# Patient Record
Sex: MALE | Race: WHITE | NOT HISPANIC OR LATINO | Employment: OTHER | URBAN - METROPOLITAN AREA
[De-identification: names, ages, dates, MRNs, and addresses within clinical notes are randomized per-mention and may not be internally consistent; named-entity substitution may affect disease eponyms.]

---

## 2018-08-13 ENCOUNTER — HOSPITAL ENCOUNTER (INPATIENT)
Facility: MEDICAL CENTER | Age: 83
LOS: 3 days | DRG: 064 | End: 2018-08-17
Attending: EMERGENCY MEDICINE | Admitting: HOSPITALIST
Payer: MEDICARE

## 2018-08-13 DIAGNOSIS — G93.40 ACUTE ENCEPHALOPATHY: ICD-10-CM

## 2018-08-13 PROCEDURE — 99285 EMERGENCY DEPT VISIT HI MDM: CPT

## 2018-08-13 PROCEDURE — 80053 COMPREHEN METABOLIC PANEL: CPT

## 2018-08-13 PROCEDURE — 83036 HEMOGLOBIN GLYCOSYLATED A1C: CPT

## 2018-08-13 PROCEDURE — 84484 ASSAY OF TROPONIN QUANT: CPT

## 2018-08-13 PROCEDURE — 85610 PROTHROMBIN TIME: CPT

## 2018-08-13 PROCEDURE — 85025 COMPLETE CBC W/AUTO DIFF WBC: CPT

## 2018-08-13 PROCEDURE — 36415 COLL VENOUS BLD VENIPUNCTURE: CPT

## 2018-08-13 PROCEDURE — 85730 THROMBOPLASTIN TIME PARTIAL: CPT

## 2018-08-13 ASSESSMENT — LIFESTYLE VARIABLES: DO YOU DRINK ALCOHOL: NO

## 2018-08-13 ASSESSMENT — PAIN SCALES - GENERAL: PAINLEVEL_OUTOF10: 0

## 2018-08-14 ENCOUNTER — APPOINTMENT (OUTPATIENT)
Dept: RADIOLOGY | Facility: MEDICAL CENTER | Age: 83
DRG: 064 | End: 2018-08-14
Attending: HOSPITALIST
Payer: MEDICARE

## 2018-08-14 ENCOUNTER — APPOINTMENT (OUTPATIENT)
Dept: RADIOLOGY | Facility: MEDICAL CENTER | Age: 83
DRG: 064 | End: 2018-08-14
Attending: EMERGENCY MEDICINE
Payer: MEDICARE

## 2018-08-14 PROBLEM — I10 ESSENTIAL HYPERTENSION: Status: ACTIVE | Noted: 2018-08-14

## 2018-08-14 PROBLEM — D64.9 NORMOCYTIC ANEMIA: Status: ACTIVE | Noted: 2018-08-14

## 2018-08-14 PROBLEM — R79.89 TROPONIN I ABOVE REFERENCE RANGE: Status: ACTIVE | Noted: 2018-08-14

## 2018-08-14 PROBLEM — G93.40 ACUTE ENCEPHALOPATHY: Status: ACTIVE | Noted: 2018-08-14

## 2018-08-14 PROBLEM — E78.5 DYSLIPIDEMIA: Status: ACTIVE | Noted: 2018-08-14

## 2018-08-14 LAB
ALBUMIN SERPL BCP-MCNC: 3.9 G/DL (ref 3.2–4.9)
ALBUMIN/GLOB SERPL: 1.6 G/DL
ALP SERPL-CCNC: 51 U/L (ref 30–99)
ALT SERPL-CCNC: 11 U/L (ref 2–50)
ANION GAP SERPL CALC-SCNC: 11 MMOL/L (ref 0–11.9)
ANION GAP SERPL CALC-SCNC: 8 MMOL/L (ref 0–11.9)
APPEARANCE UR: ABNORMAL
APTT PPP: 30.3 SEC (ref 24.7–36)
AST SERPL-CCNC: 15 U/L (ref 12–45)
BACTERIA #/AREA URNS HPF: NEGATIVE /HPF
BASOPHILS # BLD AUTO: 0.5 % (ref 0–1.8)
BASOPHILS # BLD: 0.05 K/UL (ref 0–0.12)
BILIRUB SERPL-MCNC: 0.6 MG/DL (ref 0.1–1.5)
BILIRUB UR QL STRIP.AUTO: NEGATIVE
BUN SERPL-MCNC: 26 MG/DL (ref 8–22)
BUN SERPL-MCNC: 28 MG/DL (ref 8–22)
CALCIUM SERPL-MCNC: 8.3 MG/DL (ref 8.5–10.5)
CALCIUM SERPL-MCNC: 9.1 MG/DL (ref 8.5–10.5)
CAOX CRY #/AREA URNS HPF: ABNORMAL /HPF
CHLORIDE SERPL-SCNC: 110 MMOL/L (ref 96–112)
CHLORIDE SERPL-SCNC: 110 MMOL/L (ref 96–112)
CO2 SERPL-SCNC: 23 MMOL/L (ref 20–33)
CO2 SERPL-SCNC: 25 MMOL/L (ref 20–33)
COLOR UR: YELLOW
CREAT SERPL-MCNC: 1.06 MG/DL (ref 0.5–1.4)
CREAT SERPL-MCNC: 1.39 MG/DL (ref 0.5–1.4)
EOSINOPHIL # BLD AUTO: 0.01 K/UL (ref 0–0.51)
EOSINOPHIL NFR BLD: 0.1 % (ref 0–6.9)
EPI CELLS #/AREA URNS HPF: NEGATIVE /HPF
ERYTHROCYTE [DISTWIDTH] IN BLOOD BY AUTOMATED COUNT: 45.7 FL (ref 35.9–50)
EST. AVERAGE GLUCOSE BLD GHB EST-MCNC: 114 MG/DL
GLOBULIN SER CALC-MCNC: 2.4 G/DL (ref 1.9–3.5)
GLUCOSE SERPL-MCNC: 101 MG/DL (ref 65–99)
GLUCOSE SERPL-MCNC: 102 MG/DL (ref 65–99)
GLUCOSE UR STRIP.AUTO-MCNC: NEGATIVE MG/DL
HBA1C MFR BLD: 5.6 % (ref 0–5.6)
HCT VFR BLD AUTO: 42.2 % (ref 42–52)
HGB BLD-MCNC: 13.9 G/DL (ref 14–18)
HYALINE CASTS #/AREA URNS LPF: ABNORMAL /LPF
IMM GRANULOCYTES # BLD AUTO: 0.05 K/UL (ref 0–0.11)
IMM GRANULOCYTES NFR BLD AUTO: 0.5 % (ref 0–0.9)
INR PPP: 1.06 (ref 0.87–1.13)
KETONES UR STRIP.AUTO-MCNC: ABNORMAL MG/DL
LEUKOCYTE ESTERASE UR QL STRIP.AUTO: NEGATIVE
LYMPHOCYTES # BLD AUTO: 1.07 K/UL (ref 1–4.8)
LYMPHOCYTES NFR BLD: 11.5 % (ref 22–41)
MAGNESIUM SERPL-MCNC: 2 MG/DL (ref 1.5–2.5)
MCH RBC QN AUTO: 29.1 PG (ref 27–33)
MCHC RBC AUTO-ENTMCNC: 32.9 G/DL (ref 33.7–35.3)
MCV RBC AUTO: 88.5 FL (ref 81.4–97.8)
MICRO URNS: ABNORMAL
MONOCYTES # BLD AUTO: 0.93 K/UL (ref 0–0.85)
MONOCYTES NFR BLD AUTO: 10 % (ref 0–13.4)
NEUTROPHILS # BLD AUTO: 7.2 K/UL (ref 1.82–7.42)
NEUTROPHILS NFR BLD: 77.4 % (ref 44–72)
NITRITE UR QL STRIP.AUTO: NEGATIVE
NRBC # BLD AUTO: 0 K/UL
NRBC BLD-RTO: 0 /100 WBC
PH UR STRIP.AUTO: 5 [PH]
PLATELET # BLD AUTO: 244 K/UL (ref 164–446)
PMV BLD AUTO: 10.4 FL (ref 9–12.9)
POTASSIUM SERPL-SCNC: 3.2 MMOL/L (ref 3.6–5.5)
POTASSIUM SERPL-SCNC: 4 MMOL/L (ref 3.6–5.5)
PROT SERPL-MCNC: 6.3 G/DL (ref 6–8.2)
PROT UR QL STRIP: NEGATIVE MG/DL
PROTHROMBIN TIME: 13.5 SEC (ref 12–14.6)
RBC # BLD AUTO: 4.77 M/UL (ref 4.7–6.1)
RBC # URNS HPF: ABNORMAL /HPF
RBC UR QL AUTO: ABNORMAL
SODIUM SERPL-SCNC: 141 MMOL/L (ref 135–145)
SODIUM SERPL-SCNC: 146 MMOL/L (ref 135–145)
SP GR UR STRIP.AUTO: 1.02
TROPONIN I SERPL-MCNC: 0.13 NG/ML (ref 0–0.04)
TROPONIN I SERPL-MCNC: 0.25 NG/ML (ref 0–0.04)
TROPONIN I SERPL-MCNC: 0.27 NG/ML (ref 0–0.04)
UROBILINOGEN UR STRIP.AUTO-MCNC: 1 MG/DL
WBC # BLD AUTO: 9.3 K/UL (ref 4.8–10.8)
WBC #/AREA URNS HPF: ABNORMAL /HPF

## 2018-08-14 PROCEDURE — 700111 HCHG RX REV CODE 636 W/ 250 OVERRIDE (IP): Performed by: EMERGENCY MEDICINE

## 2018-08-14 PROCEDURE — 83735 ASSAY OF MAGNESIUM: CPT

## 2018-08-14 PROCEDURE — 700102 HCHG RX REV CODE 250 W/ 637 OVERRIDE(OP): Performed by: HOSPITALIST

## 2018-08-14 PROCEDURE — 70450 CT HEAD/BRAIN W/O DYE: CPT

## 2018-08-14 PROCEDURE — 93010 ELECTROCARDIOGRAM REPORT: CPT | Performed by: INTERNAL MEDICINE

## 2018-08-14 PROCEDURE — 700111 HCHG RX REV CODE 636 W/ 250 OVERRIDE (IP): Performed by: HOSPITALIST

## 2018-08-14 PROCEDURE — A9270 NON-COVERED ITEM OR SERVICE: HCPCS | Performed by: HOSPITALIST

## 2018-08-14 PROCEDURE — 770020 HCHG ROOM/CARE - TELE (206)

## 2018-08-14 PROCEDURE — 700102 HCHG RX REV CODE 250 W/ 637 OVERRIDE(OP): Performed by: EMERGENCY MEDICINE

## 2018-08-14 PROCEDURE — 70551 MRI BRAIN STEM W/O DYE: CPT

## 2018-08-14 PROCEDURE — 36415 COLL VENOUS BLD VENIPUNCTURE: CPT

## 2018-08-14 PROCEDURE — 700101 HCHG RX REV CODE 250: Performed by: HOSPITALIST

## 2018-08-14 PROCEDURE — 99223 1ST HOSP IP/OBS HIGH 75: CPT | Performed by: HOSPITALIST

## 2018-08-14 PROCEDURE — 93005 ELECTROCARDIOGRAM TRACING: CPT | Performed by: HOSPITALIST

## 2018-08-14 PROCEDURE — 80048 BASIC METABOLIC PNL TOTAL CA: CPT

## 2018-08-14 PROCEDURE — 84484 ASSAY OF TROPONIN QUANT: CPT

## 2018-08-14 PROCEDURE — 96365 THER/PROPH/DIAG IV INF INIT: CPT

## 2018-08-14 PROCEDURE — A9270 NON-COVERED ITEM OR SERVICE: HCPCS | Performed by: EMERGENCY MEDICINE

## 2018-08-14 PROCEDURE — 81001 URINALYSIS AUTO W/SCOPE: CPT

## 2018-08-14 PROCEDURE — 700105 HCHG RX REV CODE 258: Performed by: HOSPITALIST

## 2018-08-14 PROCEDURE — 93005 ELECTROCARDIOGRAM TRACING: CPT | Performed by: EMERGENCY MEDICINE

## 2018-08-14 RX ORDER — HEPARIN SODIUM 5000 [USP'U]/ML
5000 INJECTION, SOLUTION INTRAVENOUS; SUBCUTANEOUS EVERY 8 HOURS
Status: DISCONTINUED | OUTPATIENT
Start: 2018-08-14 | End: 2018-08-17 | Stop reason: HOSPADM

## 2018-08-14 RX ORDER — ATORVASTATIN CALCIUM 40 MG/1
20 TABLET, FILM COATED ORAL
Status: ON HOLD | COMMUNITY
Start: 2014-09-01 | End: 2018-08-17

## 2018-08-14 RX ORDER — CYCLOBENZAPRINE HCL 10 MG
20 TABLET ORAL NIGHTLY PRN
Status: ON HOLD | COMMUNITY
End: 2018-08-17

## 2018-08-14 RX ORDER — ONDANSETRON 4 MG/1
4 TABLET, ORALLY DISINTEGRATING ORAL EVERY 4 HOURS PRN
Status: DISCONTINUED | OUTPATIENT
Start: 2018-08-14 | End: 2018-08-17 | Stop reason: HOSPADM

## 2018-08-14 RX ORDER — POTASSIUM CHLORIDE 20 MEQ/1
40 TABLET, EXTENDED RELEASE ORAL ONCE
Status: COMPLETED | OUTPATIENT
Start: 2018-08-14 | End: 2018-08-14

## 2018-08-14 RX ORDER — ASPIRIN 325 MG
325 TABLET ORAL DAILY
Status: DISCONTINUED | OUTPATIENT
Start: 2018-08-15 | End: 2018-08-17 | Stop reason: HOSPADM

## 2018-08-14 RX ORDER — ATENOLOL 50 MG/1
100 TABLET ORAL
Status: DISCONTINUED | OUTPATIENT
Start: 2018-08-14 | End: 2018-08-14

## 2018-08-14 RX ORDER — AMOXICILLIN 250 MG
2 CAPSULE ORAL 2 TIMES DAILY
Status: DISCONTINUED | OUTPATIENT
Start: 2018-08-14 | End: 2018-08-17 | Stop reason: HOSPADM

## 2018-08-14 RX ORDER — ATORVASTATIN CALCIUM 40 MG/1
40 TABLET, FILM COATED ORAL
Status: DISCONTINUED | OUTPATIENT
Start: 2018-08-15 | End: 2018-08-17 | Stop reason: HOSPADM

## 2018-08-14 RX ORDER — ASPIRIN 81 MG/1
324 TABLET, CHEWABLE ORAL DAILY
Status: DISCONTINUED | OUTPATIENT
Start: 2018-08-15 | End: 2018-08-17 | Stop reason: HOSPADM

## 2018-08-14 RX ORDER — ATENOLOL AND CHLORTHALIDONE TABLET 100; 25 MG/1; MG/1
1 TABLET ORAL DAILY
Status: ON HOLD | COMMUNITY
Start: 2014-09-01 | End: 2018-08-17

## 2018-08-14 RX ORDER — ACETAMINOPHEN 325 MG/1
650 TABLET ORAL EVERY 6 HOURS PRN
Status: DISCONTINUED | OUTPATIENT
Start: 2018-08-14 | End: 2018-08-17 | Stop reason: HOSPADM

## 2018-08-14 RX ORDER — LISINOPRIL 20 MG/1
20 TABLET ORAL DAILY
Status: ON HOLD | COMMUNITY
End: 2018-08-17

## 2018-08-14 RX ORDER — LISINOPRIL 20 MG/1
20 TABLET ORAL DAILY
Status: DISCONTINUED | OUTPATIENT
Start: 2018-08-14 | End: 2018-08-15

## 2018-08-14 RX ORDER — LISINOPRIL 10 MG/1
20 TABLET ORAL DAILY
Status: ON HOLD | COMMUNITY
Start: 2014-09-01 | End: 2018-08-14

## 2018-08-14 RX ORDER — SODIUM CHLORIDE AND POTASSIUM CHLORIDE 150; 450 MG/100ML; MG/100ML
INJECTION, SOLUTION INTRAVENOUS CONTINUOUS
Status: DISCONTINUED | OUTPATIENT
Start: 2018-08-14 | End: 2018-08-15

## 2018-08-14 RX ORDER — ATENOLOL 25 MG/1
25 TABLET ORAL
Status: DISCONTINUED | OUTPATIENT
Start: 2018-08-15 | End: 2018-08-14

## 2018-08-14 RX ORDER — ONDANSETRON 2 MG/ML
4 INJECTION INTRAMUSCULAR; INTRAVENOUS EVERY 4 HOURS PRN
Status: DISCONTINUED | OUTPATIENT
Start: 2018-08-14 | End: 2018-08-17 | Stop reason: HOSPADM

## 2018-08-14 RX ORDER — POTASSIUM CHLORIDE 7.45 MG/ML
10 INJECTION INTRAVENOUS ONCE
Status: COMPLETED | OUTPATIENT
Start: 2018-08-14 | End: 2018-08-14

## 2018-08-14 RX ORDER — ASPIRIN 81 MG/1
324 TABLET, CHEWABLE ORAL ONCE
Status: COMPLETED | OUTPATIENT
Start: 2018-08-14 | End: 2018-08-14

## 2018-08-14 RX ORDER — POLYETHYLENE GLYCOL 3350 17 G/17G
1 POWDER, FOR SOLUTION ORAL
Status: DISCONTINUED | OUTPATIENT
Start: 2018-08-14 | End: 2018-08-17 | Stop reason: HOSPADM

## 2018-08-14 RX ORDER — BISACODYL 10 MG
10 SUPPOSITORY, RECTAL RECTAL
Status: DISCONTINUED | OUTPATIENT
Start: 2018-08-14 | End: 2018-08-17 | Stop reason: HOSPADM

## 2018-08-14 RX ORDER — CHLORTHALIDONE 25 MG/1
25 TABLET ORAL
Status: DISCONTINUED | OUTPATIENT
Start: 2018-08-14 | End: 2018-08-14

## 2018-08-14 RX ORDER — ATENOLOL AND CHLORTHALIDONE TABLET 100; 25 MG/1; MG/1
1 TABLET ORAL DAILY
Status: DISCONTINUED | OUTPATIENT
Start: 2018-08-14 | End: 2018-08-14

## 2018-08-14 RX ORDER — ATORVASTATIN CALCIUM 20 MG/1
20 TABLET, FILM COATED ORAL
Status: DISCONTINUED | OUTPATIENT
Start: 2018-08-14 | End: 2018-08-14

## 2018-08-14 RX ORDER — ASPIRIN 300 MG/1
300 SUPPOSITORY RECTAL DAILY
Status: DISCONTINUED | OUTPATIENT
Start: 2018-08-15 | End: 2018-08-17 | Stop reason: HOSPADM

## 2018-08-14 RX ORDER — SODIUM CHLORIDE 9 MG/ML
INJECTION, SOLUTION INTRAVENOUS CONTINUOUS
Status: DISCONTINUED | OUTPATIENT
Start: 2018-08-14 | End: 2018-08-14

## 2018-08-14 RX ADMIN — POTASSIUM CHLORIDE AND SODIUM CHLORIDE: 450; 150 INJECTION, SOLUTION INTRAVENOUS at 20:54

## 2018-08-14 RX ADMIN — HEPARIN SODIUM 5000 UNITS: 5000 INJECTION, SOLUTION INTRAVENOUS; SUBCUTANEOUS at 06:13

## 2018-08-14 RX ADMIN — DOCUSATE SODIUM -SENNOSIDES 2 TABLET: 50; 8.6 TABLET, COATED ORAL at 06:13

## 2018-08-14 RX ADMIN — POTASSIUM CHLORIDE 40 MEQ: 1500 TABLET, EXTENDED RELEASE ORAL at 10:12

## 2018-08-14 RX ADMIN — POTASSIUM CHLORIDE 10 MEQ: 7.46 INJECTION, SOLUTION INTRAVENOUS at 01:45

## 2018-08-14 RX ADMIN — ASPIRIN 324 MG: 81 TABLET, CHEWABLE ORAL at 01:44

## 2018-08-14 RX ADMIN — SODIUM CHLORIDE: 9 INJECTION, SOLUTION INTRAVENOUS at 06:28

## 2018-08-14 RX ADMIN — POTASSIUM CHLORIDE AND SODIUM CHLORIDE: 450; 150 INJECTION, SOLUTION INTRAVENOUS at 10:11

## 2018-08-14 RX ADMIN — HEPARIN SODIUM 5000 UNITS: 5000 INJECTION, SOLUTION INTRAVENOUS; SUBCUTANEOUS at 15:47

## 2018-08-14 RX ADMIN — CHLORTHALIDONE 25 MG: 25 TABLET ORAL at 06:13

## 2018-08-14 RX ADMIN — LISINOPRIL 20 MG: 20 TABLET ORAL at 06:13

## 2018-08-14 RX ADMIN — HEPARIN SODIUM 5000 UNITS: 5000 INJECTION, SOLUTION INTRAVENOUS; SUBCUTANEOUS at 20:50

## 2018-08-14 RX ADMIN — ATENOLOL 100 MG: 50 TABLET ORAL at 06:13

## 2018-08-14 RX ADMIN — ATORVASTATIN CALCIUM 20 MG: 20 TABLET, FILM COATED ORAL at 20:50

## 2018-08-14 ASSESSMENT — PATIENT HEALTH QUESTIONNAIRE - PHQ9
2. FEELING DOWN, DEPRESSED, IRRITABLE, OR HOPELESS: NOT AT ALL
1. LITTLE INTEREST OR PLEASURE IN DOING THINGS: NOT AT ALL
SUM OF ALL RESPONSES TO PHQ9 QUESTIONS 1 AND 2: 0

## 2018-08-14 ASSESSMENT — ENCOUNTER SYMPTOMS
MUSCULOSKELETAL NEGATIVE: 1
GASTROINTESTINAL NEGATIVE: 1
CARDIOVASCULAR NEGATIVE: 1
NEUROLOGICAL NEGATIVE: 1
EYES NEGATIVE: 1
CONSTITUTIONAL NEGATIVE: 1
RESPIRATORY NEGATIVE: 1
PSYCHIATRIC NEGATIVE: 1

## 2018-08-14 ASSESSMENT — PAIN SCALES - GENERAL
PAINLEVEL_OUTOF10: 0

## 2018-08-14 ASSESSMENT — LIFESTYLE VARIABLES
ALCOHOL_USE: NO
EVER_SMOKED: YES

## 2018-08-14 NOTE — ED PROVIDER NOTES
"ED Provider Note    Scribed for Deon Trotter M.D. by Basil Simons. 8/13/2018, 11:52 PM.    Primary care provider: None noted.  Means of arrival: EMS  History obtained from: Patient  History limited by: None    CHIEF COMPLAINT  Chief Complaint   Patient presents with   • Altered Mental Status     for three hours       HPI  Dave Santana is a 89 y.o. male who presents to the Emergency Department For evaluation of altered mental status. Patient reports he was driving down from [place] to make an appointment with the [facility] to have a stent placed. He states he stopped to get gas and then believes he started pulling forward after finishing getting gas. He states he drove around the parking complex because his brakes were not working and reports that police had to jump on his vehicle in order to prevent the vehicle from continuing to drive. Police found the patient and report he was sitting in the car for the last three hours believing it was moving. He continues to have the sensation of the room spinning around him. He does not report any exacerbating or alleviating factors to his \"moving sensation\". 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 Dave denies any fever, chest pain, shortness of breath, abdominal pain, dysuria, nausea or vomiting prior to arrival at ER.      REVIEW OF SYSTEMS  Pertinent positives include altered mental status, dizziness.   Pertinent negatives include no fever, nausea, vomiting.   As above, all other systems reviewed and are negative.   See HPI for further details.   C.    PAST MEDICAL HISTORY  None noted    SURGICAL HISTORY  patient denies any surgical history    SOCIAL HISTORY  Social History   Substance Use Topics   • Smoking status: Never Smoker   • Smokeless tobacco: Current User     Types: Chew   • Alcohol use No      History   Drug Use No       FAMILY HISTORY  History reviewed. No pertinent family history.    CURRENT MEDICATIONS  No current " facility-administered medications on file prior to encounter.      No current outpatient prescriptions on file prior to encounter.     ALLERGIES  No Known Allergies    PHYSICAL EXAM  VITAL SIGNS: BP (!) 167/82   Pulse 90   Temp 36.3 °C (97.4 °F)   Ht 1.829 m (6')   Wt 69 kg (152 lb 1.9 oz)   BMI 20.63 kg/m²   Vitals reviewed.  Constitutional: Alert in no apparent distress.  HENT: No signs of trauma, Bilateral external ears normal, Nose normal.   Eyes: Pupils are equal and reactive, Conjunctiva normal, Non-icteric.   Neck: Normal range of motion, No tenderness, Supple, No stridor.   Lymphatic: No lymphadenopathy noted.   Cardiovascular: Regular rate and rhythm, no murmurs.   Thorax & Lungs: Normal breath sounds, No respiratory distress, No wheezing, No chest tenderness.   Abdomen: Bowel sounds normal, Soft, No tenderness, No peritoneal signs, No masses, No pulsatile masses.   Skin: Warm, Dry, No erythema, No rash.   Back: Normal alignment.  Extremities: two well healed surgical incisions under bilateral legs, Intact distal pulses, No edema, No tenderness, No cyanosis  Musculoskeletal: Good range of motion in all major joints. No tenderness to palpation or major deformities noted.   Neurologic: CN II-XII intact. Strength and sensation equal bilaterally. No pronator drift. Normal finger to nose test. Normal heel to shin. No dysdiadochokinesia. Alert and oriented x4. Gait deferred.  Psychiatric: Affect normal, Judgment normal, Mood normal.     DIAGNOSTIC STUDIES / PROCEDURES    LABS  Labs Reviewed   CBC WITH DIFFERENTIAL - Abnormal; Notable for the following:        Result Value    Hemoglobin 13.9 (*)     MCHC 32.9 (*)     Neutrophils-Polys 77.40 (*)     Lymphocytes 11.50 (*)     Monos (Absolute) 0.93 (*)     All other components within normal limits    Narrative:     Indicate which anticoagulants the patient is on:->UNKNOWN   COMP METABOLIC PANEL - Abnormal; Notable for the following:     Sodium 146 (*)      Potassium 3.2 (*)     Glucose 102 (*)     Bun 28 (*)     All other components within normal limits    Narrative:     Indicate which anticoagulants the patient is on:->UNKNOWN   TROPONIN - Abnormal; Notable for the following:     Troponin I 0.13 (*)     All other components within normal limits    Narrative:     Indicate which anticoagulants the patient is on:->UNKNOWN   ESTIMATED GFR - Abnormal; Notable for the following:     GFR If  58 (*)     GFR If Non  48 (*)     All other components within normal limits    Narrative:     Indicate which anticoagulants the patient is on:->UNKNOWN   PROTHROMBIN TIME    Narrative:     Indicate which anticoagulants the patient is on:->UNKNOWN   APTT    Narrative:     Indicate which anticoagulants the patient is on:->UNKNOWN   URINALYSIS,CULTURE IF INDICATED      All labs reviewed by me.    EKG Interpretation:  Interpreted by me    12 Lead EKG interpreted by me to show:  Normal sinus rhythm  Rate 78  Axis: Normal  Intervals: Normal  Normal ST segments  Flattened T waves in leads I and aVL,  Multiple PVCs  No STEMI  My impression of this EKG: Does not indicate ischemia or arrhythmia at this time.    RADIOLOGY  CT-HEAD W/O   Final Result      1.  No acute intracranial hemorrhage is identified.      2.  Hypodense subdural collection layering over the left cerebral hemisphere is consistent with a chronic subdural hematoma or subdural hygroma. No significant mass effect or midline shift.      3.  Diffuse atrophy and periventricular white matter change, consistent with chronic small vessel disease.           The radiologist's interpretation of all radiological studies have been reviewed by me.    COURSE & MEDICAL DECISION MAKING  Nursing notes, VS, PMSFHx reviewed in chart.  Differential diagnoses include but not limited to: CVA, CNS infection, hypglycemia, electrolyte abnormality, dehydration, alcohol intoxication, vertigo, hypoxia.     11:52 PM Patient  seen and examined at bedside. Patient arrives hypertensive but afebrile with otherwise normal vital signs. Patient appears well hydrated and non-toxic. The physical exam is unremarkable. Ordered for CT-Head w/o, urinalysis culture if indicated, CBC with differential, CMP, PT/INR, PTT, Troponin, EKG to evaluate.    Labs without leukocytosis. No appreciable anemia. Sodium is mildly elevated and potassium is slightly low. Potassium repleted via IV. Troponin elevated 0.13. Again, patient denies any chest pain or shortness of breath and his vital signs remain stable.    CT head without acute intracranial hemorrhage. There is a hypodense subdural collection that is consistent with a chronic subdural hematoma or subdural hygroma.    Patient will require admission to the hospital as he is not safe for discharge. Additionally, he will require an MRI to evaluate for acute CVA as well as troponin trending.    2:01 AM Paged hospitalist.    2:32 AM Spoke with Dr. De Guzman, Hospitalist, who agrees to see the patient.    DISPOSITION:  Patient will be admitted to Dr. De Guzman Hospitalist in guarded condition.    FINAL IMPRESSION  1. Altered mental status     Basil RIZZO (Scribe), am scribing for, and in the presence of, Deon Trotter M.D..    Electronically signed by: Basil Simons (Manish), 8/13/2018    Deon RIZZO M.D. personally performed the services described in this documentation, as scribed by Basil Simons in my presence, and it is both accurate and complete.    The note accurately reflects work and decisions made by me.  Deon Trotter  8/14/2018  6:54 AM

## 2018-08-14 NOTE — H&P
Hospital Medicine History & Physical Note    Date of Service  8/14/2018    Primary Care Physician  Pcp Pt States None    Consultants  none    Code Status  Full code     Chief Complaint  Altered    History of Presenting Illness  89 y.o. Male with history of dyslipidemia on statin therapy, essential hypertension, controlled with current medication regimen, was in his usual state of health until the day prior to admission.  He was driving down from [place] [facility].  In route, while at a gas station, he parked his car near 1 of the pumps in order to get gas, however when trying to get out of the car, he felt the car lurching forward.  Emergency medical services were eventually called, who helps stop the car.  Reportedly the patient was in his car for more than 3 hours awaiting the car to stop moving.  No other complaints.    Review of Systems  Review of Systems   Constitutional: Negative.    HENT: Negative.    Eyes: Negative.    Respiratory: Negative.    Cardiovascular: Negative.    Gastrointestinal: Negative.    Genitourinary: Negative.    Musculoskeletal: Negative.    Skin: Negative.    Neurological: Negative.    Endo/Heme/Allergies: Negative.    Psychiatric/Behavioral: Negative.        Past Medical History   has a past medical history of Dyslipidemia and Hypertension.    Surgical History   has a past surgical history that includes other cardiac surgery and other neurological surg.     Family History  family history includes Heart Attack in his mother; Stroke in his father.     Social History   reports that he has never smoked. His smokeless tobacco use includes Chew. He reports that he does not drink alcohol or use drugs.    Allergies  No Known Allergies    Medications  Prior to Admission Medications   Prescriptions Last Dose Informant Patient Reported? Taking?   atenolol-chlorthalidone (TENORETIC) 100-25 MG per tablet   Yes Yes   Sig: Take 1 Tab by mouth every day.   atorvastatin (LIPITOR) 40  MG Tab   Yes Yes   Sig: Take 20 mg by mouth every bedtime.   lisinopril (PRINIVIL) 10 MG Tab   Yes Yes   Sig: Take 20 mg by mouth every day.      Facility-Administered Medications: None       Physical Exam  Blood Pressure : (!) 167/82   Temperature: 36.3 °C (97.4 °F)   Pulse: 76   Respiration: 18   Pulse Oximetry: 100 %     Physical Exam    Laboratory:  Recent Labs      08/13/18   2345   WBC  9.3   RBC  4.77   HEMOGLOBIN  13.9*   HEMATOCRIT  42.2   MCV  88.5   MCH  29.1   MCHC  32.9*   RDW  45.7   PLATELETCT  244   MPV  10.4     Recent Labs      08/13/18   2345   SODIUM  146*   POTASSIUM  3.2*   CHLORIDE  110   CO2  25   GLUCOSE  102*   BUN  28*   CREATININE  1.39   CALCIUM  9.1     Recent Labs      08/13/18   2345   ALTSGPT  11   ASTSGOT  15   ALKPHOSPHAT  51   TBILIRUBIN  0.6   GLUCOSE  102*     Recent Labs      08/13/18   2345   APTT  30.3   INR  1.06             Lab Results   Component Value Date    TROPONINI 0.13 (H) 08/13/2018       Urinalysis:    No results found     Imaging:  CT-HEAD W/O   Final Result      1.  No acute intracranial hemorrhage is identified.      2.  Hypodense subdural collection layering over the left cerebral hemisphere is consistent with a chronic subdural hematoma or subdural hygroma. No significant mass effect or midline shift.      3.  Diffuse atrophy and periventricular white matter change, consistent with chronic small vessel disease.               Assessment/Plan:  I anticipate this patient will require at least two midnights for appropriate medical management, necessitating inpatient admission.    Essential hypertension   Assessment & Plan    Controlled with current medication regimen.  Monitor.        Troponin I above reference range   Assessment & Plan    Likely due to demand ischemia, will trend troponin.  Monitor.        Normocytic anemia   Assessment & Plan    Without evidence of bleed.  Monitor.  Transfuse if hemoglobin less than 7 g/Navid.    #        Acute encephalopathy    Assessment & Plan    Unclear etiology..  Suspect could be due to delirium, patient is currently driving down from [place] so it is possible that he has spent too much time in his car.  Will monitor.  Obtain MRI.            VTE prophylaxis: SCD heparin

## 2018-08-14 NOTE — ED NOTES
"Pt transferred from EMS gurney to bed. Pt thinks that he was driving while sitting at the gas pump. States, \" I went to go get out of the car but it just kept rolling forward and my brakes wouldn't work.\" pt connected to monitor and blood sent to lab  "

## 2018-08-14 NOTE — ED NOTES
KINDER FALL RISK       RISK  Present to ED b/c of fall (syncope, seizure, or ALOC) No  Age  >70 Yes  Altered Mental Status (Intoxicated with alcohol or substance confusion, inability to follow instructions, disorientation) Yes  Impaired Mobility (Ambulates or transfers with assistive devices or assist. Ambulates with unsteady gait and no assistance.  Unable to ambulate to transfer.) No  Nursing Judgement (Bowel or bladder incontinence, diarrhea, urinary frequency or urgency, leg weakness, orthostatic hypotension, dizziness or vertigo, narcotic use.) No    Interventions in place in red.   YES TO ANY RISK = HIGH FALL RISK     1. Move patient closer to nurses stations  2. Familiarize the patient with environment  3. Place call light within reach and demonstrate call light use  4. Keep patients personal possessions within patient safe reach (if appropriate)   5. Place stretcher in low position and brakes locked  6.Place yellow socks and armband on patient   7. Place green triangle on patients door  8. Give patient urinal if applicable  9. Keep floor surfaces clean and dry  10.Keep patient care areas uncluttered  11. Use a lap belt or posey vest   12. Assess patient hourly for :Pain, persona needs, position change, and call light access.       Slight fall risk, appropriate interventions in place

## 2018-08-14 NOTE — ED TRIAGE NOTES
"BIBA. Pt has been driving down from [place] for the last four days to make a [facility] appointment this [facility] which he missed. States he pulled up to the gas pump to get gas and the \"car started pulling forward.\" Per PD, he was sitting in the car for the last three hours thinking the car was moving.  "

## 2018-08-14 NOTE — ED NOTES
Pt appears to be comfortably resting on cart, no s/s of distress. Cart low and locked, call light within reach.

## 2018-08-14 NOTE — DIETARY
"Nutrition services: Day 0 of admit.  Dave Santana is a 89 y.o. male with admitting DX of Acute encephalopathy  Troponin I above reference range  Hx of prior stroke, dyslipidemia, essential hypertension    Consult received for weight loss PTA.  -patient reports a UBW of 230 lbs \"up until a couple of years ago\"  -weight has been steadily declining involuntarily ever since, likely r/t aging state  -pt doesn't have enough monetary funds to purchase nutrition supplements (Boost, Ensure)  -he does try to eat eggs, nuts, meat.  RD encouraged variety of nuts, nut butters added to fruit, bread, whole milk for increased kcal and protein.  He verbalizes very good understanding, although RD noted some confusion when speaking to patient.   -added soft snacks for patient to try - he reports difficulty chewing; requested Dysphagia 3 diet to RN      Assessment:  Height: 182.9 cm (6')  Weight: 71.3 kg (157 lb 3 oz)  Body mass index is 21.32 kg/m².   Diet/Intake: Regular     Evaluation:   1. % weight change x2 years = 32; severe loss likely r/t aging state  2. 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 Labs per 8/13: sodium 146, potassium 3.2, glucose 102, BUN 28, GFR 48  4. Meds: senna-docusate  5. Fluids: NaCl w/ KCl IVF @ 100 ml/hr    Recommendations/Plan:  1. Consider change to LR IVF, given hypernatremia.  2. Encourage liquids, eating small, frequent meals.   -RN to change diet to Dysphagia 3 for easier mastication.   3. Document intake of all meals and snack  as % taken in ADL's to provide interdisciplinary communication across all shifts.   4. Monitor weight.  5. Nutrition rep will continue to see patient for ongoing meal and snack preferences.           "

## 2018-08-14 NOTE — ASSESSMENT & PLAN NOTE
Stopped atenolol due to bradycardia     lisinopril to 40 qd appears to be working    Hold diurectic while patient is recovering

## 2018-08-14 NOTE — PROGRESS NOTES
2 RN skin note with Niyah     pts skin is intact. Redness to back of ears, back, buttocks, legs and heels - all blanching. Skin is intact.

## 2018-08-14 NOTE — PROGRESS NOTES
Received report from Benson at 0510. Brought pt up to the floor. Pt walked to bed. Educated on call light and not to get up without staff help. Call light within reach.

## 2018-08-14 NOTE — PROGRESS NOTES
"Dr. Valencia updated of pt's low HR, touchdown 31, sustaining NSB 40s.  Pt denies any pain, n/v, dizziness.  Pt states he \"feels pretty good\".  VS were taken and stable.  Will continue to monitor pt.   "

## 2018-08-14 NOTE — PROGRESS NOTES
"Spoke w/ pt at the bedside, he states he was in contact w/ \"3 women\" at the [facility] who were helping him to get housing set up through the [facility] here in [place]. He was supposed to meet with them on Monday 8/13, but \"got lost and ended up in [place].\" He is requesting that we get into contact with the [facility] to see where we are at with his housing. Will defer this to  at this time.   "

## 2018-08-14 NOTE — PROGRESS NOTES
Med rec complete per patient with medication bottles in his bag (returned) notified nurse  Allergies reviewed  No Po antibiotics in last 30 days    Patient's bottle stated he was taking 1 Flexeril at night but his Dr told him to increase to 2 tablets

## 2018-08-15 PROBLEM — T50.905A BRADYCARDIA, DRUG INDUCED: Status: ACTIVE | Noted: 2018-08-15

## 2018-08-15 PROBLEM — I63.9 ACUTE CVA (CEREBROVASCULAR ACCIDENT) (HCC): Status: ACTIVE | Noted: 2018-08-15

## 2018-08-15 PROBLEM — N17.9 AKI (ACUTE KIDNEY INJURY) (HCC): Status: ACTIVE | Noted: 2018-08-15

## 2018-08-15 PROBLEM — R00.1 BRADYCARDIA, DRUG INDUCED: Status: ACTIVE | Noted: 2018-08-15

## 2018-08-15 LAB
ANION GAP SERPL CALC-SCNC: 7 MMOL/L (ref 0–11.9)
BASOPHILS # BLD AUTO: 0.8 % (ref 0–1.8)
BASOPHILS # BLD: 0.06 K/UL (ref 0–0.12)
BUN SERPL-MCNC: 25 MG/DL (ref 8–22)
CALCIUM SERPL-MCNC: 8.4 MG/DL (ref 8.5–10.5)
CHLORIDE SERPL-SCNC: 111 MMOL/L (ref 96–112)
CHOLEST SERPL-MCNC: 146 MG/DL (ref 100–199)
CO2 SERPL-SCNC: 23 MMOL/L (ref 20–33)
CREAT SERPL-MCNC: 1.06 MG/DL (ref 0.5–1.4)
EKG IMPRESSION: NORMAL
EOSINOPHIL # BLD AUTO: 0.06 K/UL (ref 0–0.51)
EOSINOPHIL NFR BLD: 0.8 % (ref 0–6.9)
ERYTHROCYTE [DISTWIDTH] IN BLOOD BY AUTOMATED COUNT: 48.6 FL (ref 35.9–50)
GLUCOSE SERPL-MCNC: 90 MG/DL (ref 65–99)
HCT VFR BLD AUTO: 39.8 % (ref 42–52)
HDLC SERPL-MCNC: 34 MG/DL
HGB BLD-MCNC: 12.8 G/DL (ref 14–18)
IMM GRANULOCYTES # BLD AUTO: 0.01 K/UL (ref 0–0.11)
IMM GRANULOCYTES NFR BLD AUTO: 0.1 % (ref 0–0.9)
LDLC SERPL CALC-MCNC: 91 MG/DL
LV EJECT FRACT  99904: 65
LV EJECT FRACT MOD 2C 99903: 72.58
LV EJECT FRACT MOD 4C 99902: 57.72
LV EJECT FRACT MOD BP 99901: 66.71
LYMPHOCYTES # BLD AUTO: 2.29 K/UL (ref 1–4.8)
LYMPHOCYTES NFR BLD: 32.3 % (ref 22–41)
MCH RBC QN AUTO: 29.5 PG (ref 27–33)
MCHC RBC AUTO-ENTMCNC: 32.2 G/DL (ref 33.7–35.3)
MCV RBC AUTO: 91.7 FL (ref 81.4–97.8)
MONOCYTES # BLD AUTO: 0.83 K/UL (ref 0–0.85)
MONOCYTES NFR BLD AUTO: 11.7 % (ref 0–13.4)
NEUTROPHILS # BLD AUTO: 3.84 K/UL (ref 1.82–7.42)
NEUTROPHILS NFR BLD: 54.3 % (ref 44–72)
NRBC # BLD AUTO: 0 K/UL
NRBC BLD-RTO: 0 /100 WBC
PLATELET # BLD AUTO: 205 K/UL (ref 164–446)
PMV BLD AUTO: 10.6 FL (ref 9–12.9)
POTASSIUM SERPL-SCNC: 4 MMOL/L (ref 3.6–5.5)
RBC # BLD AUTO: 4.34 M/UL (ref 4.7–6.1)
SODIUM SERPL-SCNC: 141 MMOL/L (ref 135–145)
TRIGL SERPL-MCNC: 104 MG/DL (ref 0–149)
WBC # BLD AUTO: 7.1 K/UL (ref 4.8–10.8)

## 2018-08-15 PROCEDURE — G8978 MOBILITY CURRENT STATUS: HCPCS | Mod: CK

## 2018-08-15 PROCEDURE — 770020 HCHG ROOM/CARE - TELE (206)

## 2018-08-15 PROCEDURE — 93306 TTE W/DOPPLER COMPLETE: CPT

## 2018-08-15 PROCEDURE — 85025 COMPLETE CBC W/AUTO DIFF WBC: CPT

## 2018-08-15 PROCEDURE — 700102 HCHG RX REV CODE 250 W/ 637 OVERRIDE(OP): Performed by: HOSPITALIST

## 2018-08-15 PROCEDURE — 93880 EXTRACRANIAL BILAT STUDY: CPT

## 2018-08-15 PROCEDURE — 97165 OT EVAL LOW COMPLEX 30 MIN: CPT

## 2018-08-15 PROCEDURE — 700111 HCHG RX REV CODE 636 W/ 250 OVERRIDE (IP): Performed by: HOSPITALIST

## 2018-08-15 PROCEDURE — 80061 LIPID PANEL: CPT

## 2018-08-15 PROCEDURE — G8979 MOBILITY GOAL STATUS: HCPCS | Mod: CI

## 2018-08-15 PROCEDURE — A9270 NON-COVERED ITEM OR SERVICE: HCPCS | Performed by: HOSPITALIST

## 2018-08-15 PROCEDURE — 80048 BASIC METABOLIC PNL TOTAL CA: CPT

## 2018-08-15 PROCEDURE — G8988 SELF CARE GOAL STATUS: HCPCS | Mod: CI

## 2018-08-15 PROCEDURE — 36415 COLL VENOUS BLD VENIPUNCTURE: CPT

## 2018-08-15 PROCEDURE — 700101 HCHG RX REV CODE 250: Performed by: HOSPITALIST

## 2018-08-15 PROCEDURE — G8987 SELF CARE CURRENT STATUS: HCPCS | Mod: CJ

## 2018-08-15 PROCEDURE — 93306 TTE W/DOPPLER COMPLETE: CPT | Mod: 26 | Performed by: INTERNAL MEDICINE

## 2018-08-15 PROCEDURE — 97163 PT EVAL HIGH COMPLEX 45 MIN: CPT

## 2018-08-15 PROCEDURE — 99233 SBSQ HOSP IP/OBS HIGH 50: CPT | Performed by: HOSPITALIST

## 2018-08-15 RX ORDER — LISINOPRIL 20 MG/1
20 TABLET ORAL ONCE
Status: COMPLETED | OUTPATIENT
Start: 2018-08-15 | End: 2018-08-15

## 2018-08-15 RX ORDER — LISINOPRIL 20 MG/1
40 TABLET ORAL
Status: DISCONTINUED | OUTPATIENT
Start: 2018-08-16 | End: 2018-08-17 | Stop reason: HOSPADM

## 2018-08-15 RX ADMIN — HEPARIN SODIUM 5000 UNITS: 5000 INJECTION, SOLUTION INTRAVENOUS; SUBCUTANEOUS at 16:50

## 2018-08-15 RX ADMIN — ASPIRIN 325 MG: 325 TABLET, COATED ORAL at 05:13

## 2018-08-15 RX ADMIN — DOCUSATE SODIUM -SENNOSIDES 2 TABLET: 50; 8.6 TABLET, COATED ORAL at 05:13

## 2018-08-15 RX ADMIN — LISINOPRIL 20 MG: 20 TABLET ORAL at 16:51

## 2018-08-15 RX ADMIN — ATORVASTATIN CALCIUM 40 MG: 40 TABLET, FILM COATED ORAL at 21:37

## 2018-08-15 RX ADMIN — ACETAMINOPHEN 650 MG: 325 TABLET, FILM COATED ORAL at 16:57

## 2018-08-15 RX ADMIN — LISINOPRIL 20 MG: 20 TABLET ORAL at 05:13

## 2018-08-15 RX ADMIN — ACETAMINOPHEN 650 MG: 325 TABLET, FILM COATED ORAL at 08:35

## 2018-08-15 RX ADMIN — POTASSIUM CHLORIDE AND SODIUM CHLORIDE: 450; 150 INJECTION, SOLUTION INTRAVENOUS at 08:40

## 2018-08-15 RX ADMIN — HEPARIN SODIUM 5000 UNITS: 5000 INJECTION, SOLUTION INTRAVENOUS; SUBCUTANEOUS at 05:13

## 2018-08-15 RX ADMIN — HEPARIN SODIUM 5000 UNITS: 5000 INJECTION, SOLUTION INTRAVENOUS; SUBCUTANEOUS at 21:37

## 2018-08-15 ASSESSMENT — PAIN SCALES - GENERAL
PAINLEVEL_OUTOF10: 1
PAINLEVEL_OUTOF10: 1
PAINLEVEL_OUTOF10: 0
PAINLEVEL_OUTOF10: 3
PAINLEVEL_OUTOF10: 3
PAINLEVEL_OUTOF10: 0

## 2018-08-15 ASSESSMENT — GAIT ASSESSMENTS
DEVIATION: STEP TO;DECREASED BASE OF SUPPORT;DECREASED HEEL STRIKE;DECREASED TOE OFF
DISTANCE (FEET): 220
GAIT LEVEL OF ASSIST: CONTACT GUARD ASSIST
ASSISTIVE DEVICE: SINGLE POINT CANE

## 2018-08-15 ASSESSMENT — COGNITIVE AND FUNCTIONAL STATUS - GENERAL
STANDING UP FROM CHAIR USING ARMS: A LITTLE
TOILETING: A LITTLE
SUGGESTED CMS G CODE MODIFIER DAILY ACTIVITY: CJ
WALKING IN HOSPITAL ROOM: A LITTLE
HELP NEEDED FOR BATHING: A LITTLE
SUGGESTED CMS G CODE MODIFIER MOBILITY: CK
MOBILITY SCORE: 19
CLIMB 3 TO 5 STEPS WITH RAILING: A LOT
MOVING FROM LYING ON BACK TO SITTING ON SIDE OF FLAT BED: A LITTLE
DAILY ACTIVITIY SCORE: 22

## 2018-08-15 ASSESSMENT — ACTIVITIES OF DAILY LIVING (ADL): TOILETING: INDEPENDENT

## 2018-08-15 NOTE — DISCHARGE PLANNING
Anticipated Discharge Disposition: SNF     Action: LSW met with Pt at bedside, Pt provided SNF choice form, based on PT/OT recommendation, Pt completed choice for [facility]      LSW placed call to [facility] regarding [facility] eligibility for SNF, LSW advised Pt is not service connected (Medicare coverage active)     Pt has been working with [facility] staff member Elzbieta Garcia [phone] to locate housing     Barriers to Discharge: SNF order, SNF placement, medical clearance     Plan: Pt awaiting acceptance of SNF       Care Transition Team Assessment    Information Source  Orientation : Oriented x 4  Information Given By: Patient  Informant's Name:  (Dave Santana)  Who is responsible for making decisions for patient? : Patient    Readmission Evaluation  Is this a readmission?: No    Elopement Risk  Legal Hold: No  Ambulatory or Self Mobile in Wheelchair: Yes  Disoriented: No  Psychiatric Symptoms: None  History of Wandering: No  Elopement this Admit: No  Vocalizing Wanting to Leave: No  Displays Behaviors, Body Language Wanting to Leave: No-Not at Risk for Elopement  Elopement Risk: Not at Risk for Elopement    Interdisciplinary Discharge Planning  Patient or legal guardian wants to designate a caregiver (see row info): No  Housing / Facility: Unable To Determine At This Time  Prior Services: Home-Independent    Discharge Preparedness  What is your plan after discharge?: Other (comment) (Pt. reports  at [facility] is assisting w/ housing )  What are your discharge supports?:  ([facility])  Prior Functional Level: Ambulatory  Difficulity with ADLs: None    Functional Assesment  Prior Functional Level: Ambulatory    Finances  Financial Barriers to Discharge: No  Prescription Coverage: Yes    Vision / Hearing Impairment  Vision Impairment : Yes  Right Eye Vision: Impaired, Wears Glasses  Left Eye Vision: Impaired, Wears Glasses  Hearing Impairment : Yes  Hearing Impairment: Left Ear, Hearing Device(s)  Available    Values / Beliefs / Concerns  Values / Beliefs Concerns : No    Advance Directive  Advance Directive?: Living Will    Domestic Abuse  Have you ever been the victim of abuse or violence?: No  Physical Abuse or Sexual Abuse: No  Verbal Abuse or Emotional Abuse: No    Psychological Assessment  History of Substance Abuse: None  History of Psychiatric Problems: No  Non-compliant with Treatment: No    Discharge Risks or Barriers  Discharge risks or barriers?: Other (comment) (Currently homeless, working with VA )  Patient risk factors: Homeless    Anticipated Discharge Information  Anticipated discharge disposition:  (Unsure at this time)      Anticipated Discharge Disposition: Plan unknown, currently homeless   Action: LSW spoke with pt at bedside. Pt currently working with [facility]  to find housing in [place], pt from [place]. Pt  from spouse but reported that she and his brother-in-law Johnnie (who lives w/ pt's wife) are both next of kin and can be contacted [phone].   LSW left voicemail w/  Elzbieta Garcia [phone].      Barriers to Discharge: Medical clearance, housing    Plan: Waiting medical clearance and housing

## 2018-08-15 NOTE — PROGRESS NOTES
Pt's niece called RN and said pt was supposed to [facility]. M[facility] faxed to the [facility] . Dr. Valencia updated.

## 2018-08-15 NOTE — PROGRESS NOTES
Pt hr sustaining in the 30s with occassional junctional beats. Asymptomatic. Spoke with Dr. Mckeon. Waiting for a call back.

## 2018-08-15 NOTE — ASSESSMENT & PLAN NOTE
Asa    Statin    Control bp    neuro checks    Pt and ot eval    Echo and carotids noted    telemetry

## 2018-08-15 NOTE — PROGRESS NOTES
No new orders. Told to call if pt becomes symptomatic. Pt denies any discomfort, dizziness or sob. Pt sleeping comfortably.VS stable

## 2018-08-15 NOTE — PROGRESS NOTES
Updated Dr. Valencia of pt status, overnight events, and EKG changes.  Pt denies any chest pain, dizziness, n/v. To monitor pt closely.  Will follow orders.

## 2018-08-15 NOTE — PROGRESS NOTES
[facility] Hospitalist Progress Note    Date of Service: 8/15/2018    Chief Complaint  89 y.o. male admitted 2018 with aloc    Interval Problem Update  Patient found to have small acute stroke    He is weak    No focal deficits    Advance directives discussed , he is DNR    afeebrile    Patient having issue with bradycardia overnight    bp is elevated and uncontrolled    Consultants/Specialty  none    Disposition  home        Review of Systems   HENT: Negative.    Eyes: Negative.    Respiratory: Negative.    Cardiovascular: Negative.    Genitourinary: Negative.    Musculoskeletal: Negative.    Neurological: Positive for weakness.   Psychiatric/Behavioral: Negative.    All other systems reviewed and are negative.     Physical Exam  Laboratory/Imaging   Hemodynamics  Temp (24hrs), Av.3 °C (97.4 °F), Min:36.1 °C (96.9 °F), Max:36.5 °C (97.7 °F)   Temperature: 36.5 °C (97.7 °F)  Pulse  Av.1  Min: 34  Max: 90    Blood Pressure : (!) 162/73      Respiratory      Respiration: 17, Pulse Oximetry: 98 %             Fluids    Intake/Output Summary (Last 24 hours) at 08/15/18 1534  Last data filed at 08/15/18 1200   Gross per 24 hour   Intake             1680 ml   Output              600 ml   Net             1080 ml       Nutrition  Orders Placed This Encounter   Procedures   • Diet Order Regular     Standing Status:   Standing     Number of Occurrences:   1     Order Specific Question:   Diet:     Answer:   Regular [1]     Order Specific Question:   Texture/Fiber modifications:     Answer:   Chopped Meat [5]     Order Specific Question:   Consistency/Fluid modifications:     Answer:   Thin Liquids [3]     Physical Exam   Constitutional: He is oriented to person, place, and time. No distress.   HENT:   Head: Normocephalic and atraumatic.   Mouth/Throat: No oropharyngeal exudate.   Eyes: Right eye exhibits no discharge. Left eye exhibits no discharge. No scleral icterus.   Neck: No JVD present. No tracheal deviation  present. No thyromegaly present.   Cardiovascular: Normal rate.  Exam reveals no gallop and no friction rub.    No murmur heard.  Pulmonary/Chest: Effort normal and breath sounds normal. No respiratory distress. He has no wheezes.   Abdominal: Soft. Bowel sounds are normal. He exhibits no distension. There is no tenderness. There is no rebound.   Musculoskeletal: He exhibits no edema or deformity.   Neurological: He is alert and oriented to person, place, and time. No cranial nerve deficit. Coordination normal.   Skin: No rash noted. He is not diaphoretic. No erythema.       Recent Labs      08/13/18 2345  08/15/18   0354   WBC  9.3  7.1   RBC  4.77  4.34*   HEMOGLOBIN  13.9*  12.8*   HEMATOCRIT  42.2  39.8*   MCV  88.5  91.7   MCH  29.1  29.5   MCHC  32.9*  32.2*   RDW  45.7  48.6   PLATELETCT  244  205   MPV  10.4  10.6     Recent Labs      08/13/18   2345  08/14/18   2222  08/15/18   0354   SODIUM  146*  141  141   POTASSIUM  3.2*  4.0  4.0   CHLORIDE  110  110  111   CO2  25  23  23   GLUCOSE  102*  101*  90   BUN  28*  26*  25*   CREATININE  1.39  1.06  1.06   CALCIUM  9.1  8.3*  8.4*     Recent Labs      08/13/18   2345   APTT  30.3   INR  1.06         Recent Labs      08/15/18   0354   TRIGLYCERIDE  104   HDL  34*   LDL  91          Assessment/Plan     * Acute CVA (cerebrovascular accident) (HCC)- (present on admission)   Assessment & Plan    Asa    Statin    Control bp    neuro checks    Pt and ot eval    Echo and carotids    telemetry        Bradycardia, drug induced- (present on admission)   Assessment & Plan    Stop  atenolol for now        GERA (acute kidney injury) (HCC)- (present on admission)   Assessment & Plan    Due to dehydration    Resolved with IVF        Dyslipidemia- (present on admission)   Assessment & Plan    lipitor daily        Essential hypertension- (present on admission)   Assessment & Plan    Stopped atenolol due to bradycardia    Increased lisinopril to 40 qd    Hold diurectic while  patient is recovering        Troponin I above reference range- (present on admission)   Assessment & Plan    Likely due to demand ischemia, will trend troponin.  Monitor.        Normocytic anemia- (present on admission)   Assessment & Plan    Without evidence of bleed.  Monitor.  Transfuse if hemoglobin less than 7 g/Navid.    #        Acute encephalopathy due to stroke- (present on admission)   Assessment & Plan    Treat stroke          Quality-Core Measures   Reich catheter::  No Reich  DVT prophylaxis - mechanical:  SCDs

## 2018-08-15 NOTE — PROGRESS NOTES
MONITOR SUMMARY  SB 33-39  LOW OF 26 UNSUSTAINED  JUNCTIONAL, O PVC  2.1,2.4,2.02,2.02 SEC PAUSE- ASYMPTOMATIC  0.14/0.08/0.52    MD AWARE

## 2018-08-15 NOTE — PROGRESS NOTES
Pt david ( into the 20s) and having pauses (3.85, 2.0,2.1). Dr Mckeon aware. Orders for BMP, mg placed. Will contact her back if any abnormal values.

## 2018-08-15 NOTE — THERAPY
"Physical Therapy Evaluation completed.   Bed Mobility:  Supine to Sit: Stand by Assist  Transfers: Sit to Stand: Contact Guard Assist  Gait: Level Of Assist: Contact Guard Assist with Single Point Cane   (recommend using FWW)    Plan of Care: Will benefit from Physical Therapy 3 times per week  Discharge Recommendations: Equipment: Will Continue to Assess for Equipment Needs.     See \"Rehab Therapy-Acute\" Patient Summary Report for complete documentation.     Pt has a PMHx of HTN and DLD, recently admitted for an AMS. Pt presented to PT for primary risk reduction for LOB/falling. Pt presented with impaired balance, impaired gait, poor saftey awareness, and dec activity tolerance. Pt was able to demonstrate CGA for all functionla mobility at this time during standing and ambulating activties. Demonstrated 2 jose LOB during ambulation and required CGA from therapist for correction and external support from furniture and walls. Pt required 1 rest break due to fatigue and SOB. Pt recommended use of FWW at this time due to impaired balance. Pt will require 24/7 supervision assist upon d/c home and  therapy services. However, if patient is unable to have 24/7 assist, pt may benefit from post acute therapy services prior to d/c home.   "

## 2018-08-15 NOTE — THERAPY
"Occupational Therapy Evaluation completed.   Functional Status:  SBA for supine>sit; CGA for sit>stand and grooming while standing; SPV for LB dressing  Plan of Care: Will benefit from Occupational Therapy 2 times per week  Discharge Recommendations:  Equipment: Will Continue to Assess for Equipment Needs. Post-acute therapy Discharge to home with outpatient or home health for additional skilled therapy services.    See \"Rehab Therapy-Acute\" Patient Summary Report for complete documentation.    OT eval completed on 89 YO M. Pt limited due to impaired insight into current deficits, poor safety awareness, decreased functional mobility and dynamic standing balance. Pt would require 24/7 SPV post-acute for safety and fall prevention. If 24/7 SPV is not available, pt would benefit from stay at transitional care facility for continued strengthening. Will continue to follow for acute OT services while in-house.  "

## 2018-08-15 NOTE — DISCHARGE PLANNING
Received Choice form at 4280  Agency/Facility Name: [facility]  Referral sent per Choice form @ [phone]

## 2018-08-16 PROCEDURE — A9270 NON-COVERED ITEM OR SERVICE: HCPCS | Performed by: HOSPITALIST

## 2018-08-16 PROCEDURE — 700102 HCHG RX REV CODE 250 W/ 637 OVERRIDE(OP): Performed by: HOSPITALIST

## 2018-08-16 PROCEDURE — 770020 HCHG ROOM/CARE - TELE (206)

## 2018-08-16 PROCEDURE — 99232 SBSQ HOSP IP/OBS MODERATE 35: CPT | Performed by: HOSPITALIST

## 2018-08-16 PROCEDURE — 700111 HCHG RX REV CODE 636 W/ 250 OVERRIDE (IP): Performed by: HOSPITALIST

## 2018-08-16 RX ADMIN — HEPARIN SODIUM 5000 UNITS: 5000 INJECTION, SOLUTION INTRAVENOUS; SUBCUTANEOUS at 05:08

## 2018-08-16 RX ADMIN — LISINOPRIL 40 MG: 20 TABLET ORAL at 05:08

## 2018-08-16 RX ADMIN — ASPIRIN 325 MG: 325 TABLET, COATED ORAL at 05:08

## 2018-08-16 RX ADMIN — ATORVASTATIN CALCIUM 40 MG: 40 TABLET, FILM COATED ORAL at 21:28

## 2018-08-16 RX ADMIN — DOCUSATE SODIUM -SENNOSIDES 2 TABLET: 50; 8.6 TABLET, COATED ORAL at 17:35

## 2018-08-16 RX ADMIN — HEPARIN SODIUM 5000 UNITS: 5000 INJECTION, SOLUTION INTRAVENOUS; SUBCUTANEOUS at 21:28

## 2018-08-16 RX ADMIN — HEPARIN SODIUM 5000 UNITS: 5000 INJECTION, SOLUTION INTRAVENOUS; SUBCUTANEOUS at 14:20

## 2018-08-16 ASSESSMENT — PAIN SCALES - GENERAL
PAINLEVEL_OUTOF10: 0

## 2018-08-16 ASSESSMENT — ENCOUNTER SYMPTOMS
RESPIRATORY NEGATIVE: 1
MUSCULOSKELETAL NEGATIVE: 1
EYES NEGATIVE: 1
CARDIOVASCULAR NEGATIVE: 1
WEAKNESS: 1
PSYCHIATRIC NEGATIVE: 1
GASTROINTESTINAL NEGATIVE: 1

## 2018-08-16 NOTE — DISCHARGE PLANNING
Anticipated Discharge Disposition: SNF    Action: LSW was notified SNF order is still needed. LSW paged MD, who will enter in order.    Barriers to Discharge: None    Plan: LSW to follow for d/c planning

## 2018-08-16 NOTE — PROGRESS NOTES
"Pt told RN that \"in case I have another massive stroke, I do not want any extraordinary measures. I just want to die if that happens. I've lived a long life and I'm happy that I did.\"  MD updated.   "

## 2018-08-16 NOTE — CARE PLAN
Problem: Safety  Goal: Will remain free from falls  Outcome: PROGRESSING AS EXPECTED      Problem: Venous Thromboembolism (VTW)/Deep Vein Thrombosis (DVT) Prevention:  Goal: Patient will participate in Venous Thrombosis (VTE)/Deep Vein Thrombosis (DVT)Prevention Measures  Outcome: PROGRESSING AS EXPECTED

## 2018-08-16 NOTE — PROGRESS NOTES
[facility] Hospitalist Progress Note    Date of Service: 2018    Chief Complaint  89 y.o. male admitted 2018 with aloc    Interval Problem Update  Patient found to have small acute stroke    He is weak    No focal deficits    DNR    bp is better    PT eval recommends 24 hour supervision or SNF    Consultants/Specialty  none    Disposition  home        Review of Systems   HENT: Negative.    Respiratory: Negative.    Cardiovascular: Negative.    Gastrointestinal: Negative.    Genitourinary: Negative.    Musculoskeletal: Negative.    Skin: Negative.    Neurological: Positive for weakness.   Psychiatric/Behavioral: Negative.    All other systems reviewed and are negative.     Physical Exam  Laboratory/Imaging   Hemodynamics  Temp (24hrs), Av.3 °C (97.4 °F), Min:36 °C (96.8 °F), Max:37.1 °C (98.8 °F)   Temperature: 36 °C (96.8 °F)  Pulse  Av.4  Min: 34  Max: 90    Blood Pressure : 135/66      Respiratory      Respiration: 18, Pulse Oximetry: 100 %     Work Of Breathing / Effort: Mild  RUL Breath Sounds: Clear, RML Breath Sounds: Clear, RLL Breath Sounds: Diminished, NATALIE Breath Sounds: Clear, LLL Breath Sounds: Diminished    Fluids    Intake/Output Summary (Last 24 hours) at 18 1420  Last data filed at 18 0600   Gross per 24 hour   Intake              240 ml   Output              750 ml   Net             -510 ml       Nutrition  Orders Placed This Encounter   Procedures   • Diet Order Regular     Standing Status:   Standing     Number of Occurrences:   1     Order Specific Question:   Diet:     Answer:   Regular [1]     Order Specific Question:   Texture/Fiber modifications:     Answer:   Chopped Meat [5]     Order Specific Question:   Consistency/Fluid modifications:     Answer:   Thin Liquids [3]     Physical Exam   Constitutional: He is oriented to person, place, and time. No distress.   HENT:   Head: Normocephalic and atraumatic.   Mouth/Throat: No oropharyngeal exudate.   Eyes: Right eye  exhibits no discharge. Left eye exhibits no discharge. No scleral icterus.   Neck: No JVD present. No tracheal deviation present. No thyromegaly present.   Cardiovascular: Normal rate.  Exam reveals no gallop and no friction rub.    No murmur heard.  Pulmonary/Chest: Effort normal and breath sounds normal. No respiratory distress. He has no wheezes.   Abdominal: Soft. Bowel sounds are normal. He exhibits no distension. There is no tenderness. There is no rebound.   Musculoskeletal: He exhibits no edema or deformity.   Neurological: He is alert and oriented to person, place, and time. No cranial nerve deficit. Coordination normal.   Skin: No rash noted. He is not diaphoretic. No erythema.       Recent Labs      08/13/18   2345  08/15/18   0354   WBC  9.3  7.1   RBC  4.77  4.34*   HEMOGLOBIN  13.9*  12.8*   HEMATOCRIT  42.2  39.8*   MCV  88.5  91.7   MCH  29.1  29.5   MCHC  32.9*  32.2*   RDW  45.7  48.6   PLATELETCT  244  205   MPV  10.4  10.6     Recent Labs      08/13/18   2345  08/14/18   2222  08/15/18   0354   SODIUM  146*  141  141   POTASSIUM  3.2*  4.0  4.0   CHLORIDE  110  110  111   CO2  25  23  23   GLUCOSE  102*  101*  90   BUN  28*  26*  25*   CREATININE  1.39  1.06  1.06   CALCIUM  9.1  8.3*  8.4*     Recent Labs      08/13/18   2345   APTT  30.3   INR  1.06         Recent Labs      08/15/18   0354   TRIGLYCERIDE  104   HDL  34*   LDL  91          Assessment/Plan     * Acute CVA (cerebrovascular accident) (HCC)- (present on admission)   Assessment & Plan    Asa    Statin    Control bp    neuro checks    Pt and ot eval    Echo and carotids noted    telemetry        Bradycardia, drug induced- (present on admission)   Assessment & Plan    Stop  atenolol for now        GERA (acute kidney injury) (HCC)- (present on admission)   Assessment & Plan    Due to dehydration    Resolved with IVF        Dyslipidemia- (present on admission)   Assessment & Plan    lipitor daily        Essential hypertension- (present on  admission)   Assessment & Plan    Stopped atenolol due to bradycardia     lisinopril to 40 qd appears to be working    Hold diurectic while patient is recovering        Troponin I above reference range- (present on admission)   Assessment & Plan    Likely due to demand ischemia.        Normocytic anemia- (present on admission)   Assessment & Plan    Without evidence of bleed.  Monitor.  Transfuse if hemoglobin less than 7 g/Navid.    #        Acute encephalopathy due to stroke- (present on admission)   Assessment & Plan    Treat stroke          Quality-Core Measures   Reich catheter::  No Reich  DVT prophylaxis - mechanical:  SCDs

## 2018-08-16 NOTE — PROGRESS NOTES
Skin Team Assessment   Grey foam applied to oxygen tubing and mepilex applied to sacrum. RN notified.

## 2018-08-16 NOTE — DISCHARGE PLANNING
Anticipated Discharge Disposition: SNF     Action: LSW spoke with [facility] liaison Elzbieta over the phone ([phone]). Elzbieta reports that in order for pt to receive financial assistance for his SNF placement, pt needs to be admitted to a SNF that is [facility] w/ [facility]. Elzbieta informed LSW that [facility] and [facility] are two options for pt.   LSW spoke w/ pt at bedside in regards to conversation w/ Elzbieta. Pt signed choice for [facility]. LSW faxed choice to [facility].      Barriers to Discharge: SNF acceptance      Plan: Pt to d/c to [facility] SNF when medically cleared.

## 2018-08-16 NOTE — CARE PLAN
Problem: Communication  Goal: The ability to communicate needs accurately and effectively will improve  Outcome: PROGRESSING AS EXPECTED      Problem: Infection  Goal: Will remain free from infection  Outcome: PROGRESSING AS EXPECTED

## 2018-08-16 NOTE — PROGRESS NOTES
Bedside report received. Patient sleeping comfortably in bed, RR WNL. No complaints at this time. Call light within reach.

## 2018-08-17 VITALS
RESPIRATION RATE: 18 BRPM | BODY MASS INDEX: 22.34 KG/M2 | DIASTOLIC BLOOD PRESSURE: 75 MMHG | OXYGEN SATURATION: 94 % | HEIGHT: 72 IN | TEMPERATURE: 97.2 F | HEART RATE: 56 BPM | WEIGHT: 164.9 LBS | SYSTOLIC BLOOD PRESSURE: 170 MMHG

## 2018-08-17 PROBLEM — R79.89 TROPONIN I ABOVE REFERENCE RANGE: Status: RESOLVED | Noted: 2018-08-14 | Resolved: 2018-08-17

## 2018-08-17 PROBLEM — G93.40 ACUTE ENCEPHALOPATHY: Status: RESOLVED | Noted: 2018-08-14 | Resolved: 2018-08-17

## 2018-08-17 PROBLEM — R00.1 BRADYCARDIA, DRUG INDUCED: Status: RESOLVED | Noted: 2018-08-15 | Resolved: 2018-08-17

## 2018-08-17 PROBLEM — N17.9 AKI (ACUTE KIDNEY INJURY) (HCC): Status: RESOLVED | Noted: 2018-08-15 | Resolved: 2018-08-17

## 2018-08-17 PROBLEM — T50.905A BRADYCARDIA, DRUG INDUCED: Status: RESOLVED | Noted: 2018-08-15 | Resolved: 2018-08-17

## 2018-08-17 PROCEDURE — 700111 HCHG RX REV CODE 636 W/ 250 OVERRIDE (IP): Performed by: HOSPITALIST

## 2018-08-17 PROCEDURE — 700102 HCHG RX REV CODE 250 W/ 637 OVERRIDE(OP): Performed by: HOSPITALIST

## 2018-08-17 PROCEDURE — 99239 HOSP IP/OBS DSCHRG MGMT >30: CPT | Performed by: HOSPITALIST

## 2018-08-17 PROCEDURE — A9270 NON-COVERED ITEM OR SERVICE: HCPCS | Performed by: HOSPITALIST

## 2018-08-17 RX ORDER — ATORVASTATIN CALCIUM 40 MG/1
40 TABLET, FILM COATED ORAL DAILY
Start: 2018-08-17 | End: 2023-04-11

## 2018-08-17 RX ORDER — HYDRALAZINE HYDROCHLORIDE 25 MG/1
25 TABLET, FILM COATED ORAL 3 TIMES DAILY
Qty: 90 TAB
Start: 2018-08-17 | End: 2023-04-11

## 2018-08-17 RX ORDER — ASPIRIN 325 MG
325 TABLET ORAL DAILY
Qty: 30 TAB
Start: 2018-08-18 | End: 2023-04-11

## 2018-08-17 RX ORDER — LISINOPRIL 40 MG/1
40 TABLET ORAL DAILY
Qty: 30 TAB
Start: 2018-08-18 | End: 2023-04-11

## 2018-08-17 RX ORDER — HYDRALAZINE HYDROCHLORIDE 25 MG/1
25 TABLET, FILM COATED ORAL ONCE
Status: COMPLETED | OUTPATIENT
Start: 2018-08-17 | End: 2018-08-17

## 2018-08-17 RX ADMIN — HYDRALAZINE HYDROCHLORIDE 25 MG: 25 TABLET, FILM COATED ORAL at 14:00

## 2018-08-17 RX ADMIN — LISINOPRIL 40 MG: 20 TABLET ORAL at 05:38

## 2018-08-17 RX ADMIN — HEPARIN SODIUM 5000 UNITS: 5000 INJECTION, SOLUTION INTRAVENOUS; SUBCUTANEOUS at 05:38

## 2018-08-17 RX ADMIN — ASPIRIN 325 MG: 325 TABLET, COATED ORAL at 05:38

## 2018-08-17 ASSESSMENT — PAIN SCALES - GENERAL
PAINLEVEL_OUTOF10: 0

## 2018-08-17 NOTE — PROGRESS NOTES
Resting comfortably in bed, denies pain/SOB/palpitations. MD contacted as patient has been having frequent ectopy throughout the day - asymptomatic.

## 2018-08-17 NOTE — DISCHARGE PLANNING
Anticipated Discharge Disposition: SNF    Action: COBRA packet completed. Placed in pt's chart.   Mikael from [facility] is at bedside assisting pt with completing [facility]. Ava at bedside as well.     Barriers to Discharge: None    Plan: Transfer to SNF

## 2018-08-17 NOTE — PROGRESS NOTES
Monitor Summary:    SB 38-57  (O) PVC  (R) Bigeminy - 2 episodes of bigeminy  (R) Couplet    .16/.08/.44

## 2018-08-17 NOTE — DISCHARGE SUMMARY
Discharge Summary    CHIEF COMPLAINT ON ADMISSION  Chief Complaint   Patient presents with   • Altered Mental Status     for three hours       Reason for Admission  [facility]    Admission Date  8/13/2018    CODE STATUS  Full Code    HPI & HOSPITAL COURSE  89 y.o. Male with history of dyslipidemia on statin therapy, essential hypertension, controlled with current medication regimen, was in his usual state of health until the day prior to admission.  He was [place] down from [place] [facility].  In route, while at a gas station, he parked his car near 1 of the pumps in order to get gas, however when trying to get out of the car, he felt the car lurching forward.  Emergency medical services were eventually called, who helps stop the car.  Reportedly the patient was in his car for more than 3 hours awaiting the car to stop moving.  No other complaints.        He was admitted. His mental status improved. He was found to have small acute cva. His bp was controlled. We stopped his home diurectic as it appeared he was prone to dehydration. We stopped his b blocker as he was prone to bradycardia. Without these 2 medications , his BP was high. We increased lisinopril  From 20 to 40mg po daily and added po hydralazine    He did not have any focal deficits at time of discharge. He has general weakness and would benefit from ongoing pt and ot    Therefore, he is discharged in good and stable condition to home with close outpatient follow-up.    The patient met 2-midnight criteria for an inpatient stay at the time of discharge.    Discharge Date  8/17    FOLLOW UP ITEMS POST DISCHARGE      DISCHARGE DIAGNOSES  Principal Problem:    Acute CVA (cerebrovascular accident) (HCC) POA: Yes  Active Problems:    Normocytic anemia POA: Yes    Essential hypertension POA: Yes    Dyslipidemia POA: Yes  Resolved Problems:    Acute encephalopathy due to stroke POA: Yes    Troponin I above reference range POA: Yes    GERA (acute  kidney injury) (HCC) POA: Yes    Bradycardia, drug induced POA: Yes      FOLLOW UP  No future appointments.  No follow-up provider specified.    MEDICATIONS ON DISCHARGE     Medication List      START taking these medications      Instructions   aspirin 325 MG Tabs  Commonly known as:  ASA   Take 1 Tab by mouth every day.  Dose:  325 mg     hydrALAZINE 25 MG Tabs  Commonly known as:  APRESOLINE   Take 1 Tab by mouth 3 times a day.  Dose:  25 mg        CHANGE how you take these medications      Instructions   atorvastatin 40 MG Tabs  What changed:  · how much to take  · when to take this  Commonly known as:  LIPITOR   Take 1 Tab by mouth every day.  Dose:  40 mg     lisinopril 40 MG tablet  What changed:  · medication strength  · how much to take  · Another medication with the same name was removed. Continue taking this medication, and follow the directions you see here.  Commonly known as:  PRINIVIL, ZESTRIL   Take 1 Tab by mouth every day.  Dose:  40 mg        STOP taking these medications    atenolol-chlorthalidone 100-25 MG per tablet  Commonly known as:  TENORETIC     cyclobenzaprine 10 MG Tabs  Commonly known as:  FLEXERIL            Allergies  No Known Allergies    DIET  Orders Placed This Encounter   Procedures   • Diet Order Regular     Standing Status:   Standing     Number of Occurrences:   1     Order Specific Question:   Diet:     Answer:   Regular [1]     Order Specific Question:   Texture/Fiber modifications:     Answer:   Chopped Meat [5]     Order Specific Question:   Consistency/Fluid modifications:     Answer:   Thin Liquids [3]       ACTIVITY  As tolerated.  Weight bearing as tolerated    CONSULTATIONS  none    PROCEDURES  Order   MR-BRAIN-W/O [[id number]] (Order [id number])   Patient Information     Patient Name  Dave Santana ([id number]) Sex  Male   1927   Room Bed Code Status Current Location   T7 01 FULL 01   Reprint Order Requisition     MR-BRAIN-W/O (Order #[id number]) on 18   Last  Resulted Time   Tue Aug 14, 2018  3:53 PM   Images     Show images for MR-BRAIN-W/O   Imaging Result Status     Status: Final result (Exam End: 8/14/2018  3:32 PM)   Imaging Previous Results     Open Hard Copy Result Report (Order #[id number] - MR-BRAIN-W/O)   Narrative       8/14/2018 3:08 PM    HISTORY/REASON FOR EXAM:  Headache    TECHNIQUE/EXAM DESCRIPTION AND NUMBER OF VIEWS:  MRI of the brain without contrast.    The study was performed on a Instructure. 1.5 Mignon MRI scanner. Spoiled-GRASS sagittal, thin-section T2 fast spin-echo axial, T1 coronal, and FLAIR coronal images were obtained of the whole brain.    FINDINGS:  The calvariae are normal. There are no extra-axial fluid collections. There is a pattern of moderate cerebral atrophy manifest as prominence of sulcal markings over the convexities and vertex along with moderate ventriculomegaly.    There is an 8 mm left frontoparietal subdural hygroma. There is a small sized acute to subacute infarct involving the left posterior temporoparietal region. There is a pattern of moderate supratentorial white matter disease with patchy and focal areas of   bright T2 and FLAIR signal in the subcortical and deep white matter of both hemispheres consistent with small vessel ischemic change versus demyelination or gliosis. There is disproportionate advanced atrophy involving the bilateral posterior frontal   parietal cortex.  There is no mass effect or midline shift. There are no hemorrhagic lesions. The brainstem and posterior fossa structures are unremarkable.    Vascular flow voids in the carotid and vertebrobasilar arteries, [place], and dural venous sinuses are intact. The visualized paranasal sinuses and mastoid air cells appear clear.   Impression       1.  Small sized acute infarct involving the left posterior temporal parietal region.  2.  Disproportionate advanced atrophy involving the bilateral posterior frontal parietal lobes.  3.  Moderate microangiopathic  ischemic change versus demyelination or gliosis.  4.  Chronic ischemic pontine gliosis.  5.  Moderate diffuse cerebral substance loss.  6.  8mm left posterior frontoparietal subdural hygroma     -----------------------------------------------------------------------  MRN: [id number], : 1927, Sex: M  Adm: 2018, D/C: --   Order   Echocardiogram Comp W/O cont [[id number]] (Order [id number])   Patient Information     Patient Name  Dave Garcia ([id number]) Sex  Male   1927   Room Bed Code Status Current Location   [facility] FULL 01   Reprint Order Requisition     Echocardiogram Comp W/O cont (Order #[id number]) on 18   Linked Documents     View SynapseCV Report   Last Resulted Time   Wed Aug 15, 2018 12:43 PM   Images     Show images for Echocardiogram Comp W/O cont   Imaging Result Status     Status: Final result (Collected: 8/15/2018 10:20 AM)   Imaging Previous Results     Open Hard Copy Result Report (Order #[id number] - Echocardiogram Comp W/O cont)   Narrative     Transthoracic  Echo Report      Echocardiography Laboratory    CONCLUSIONS  Normal left ventricular systolic function.  Mildly dilated left atrium.  Right ventricular systolic pressure is estimated to be 25 mmHg.    DAVE GARCIA  Exam Date:         08/15/2018                      10:20  Exam Location:     Inpatient  Priority:          Routine    Ordering Physician:        JORGE MUHAMMAD  Referring Physician:       JB Domingo  Sonographer:               Nadja Ruiz RDCS    Age:    89     Gender:    M  MRN:    [id number]  :    1927  BSA:    1.96   Ht (in):    72     Wt (lb):    164  Exam Type:     Complete    Indications:     Transient cerebral ischemic attack, unspecified  ICD Codes:       G459    CPT Codes:       36790    BP:   126    /   61     HR:   40  Technical Quality:       Fair    MEASUREMENTS  (Male / Female) Normal Values  2D ECHO  LV Diastolic Diameter PLAX        4.6 cm                4.2 - 5.9 / 3.9 - 5.3    cm  LV Systolic Diameter PLAX         3.3 cm                2.1 - 4.0 cm  IVS Diastolic Thickness           1.3 cm                  LVPW Diastolic Thickness          1.3 cm                  LVOT Diameter                     2.5 cm                  Estimated LV Ejection Fraction    65 %                    LV Ejection Fraction MOD BP       66.7 %                >= 55  %  LV Ejection Fraction MOD 4C       57.7 %                  LV Ejection Fraction MOD 2C       72.6 %                  IVC Diameter                      2.2 cm                    M-MODE  Aortic Root Diameter MM           3.3 cm                    DOPPLER  AV Peak Velocity                  1.4 m/s                 AV Peak Gradient                  7.7 mmHg                AV Mean Gradient                  3.9 mmHg                LVOT Peak Velocity                0.65 m/s                AV Area Cont Eq vti               2.4 cm²                 Mitral E Point Velocity           0.6 m/s                 Mitral E to A Ratio               0.83                    Mitral A Duration                 125 ms                  MV Pressure Half Time             107 ms                  MV Area PHT                       2.1 cm²                 MV Deceleration Time              370 ms                  TR Peak Velocity                  234 cm/s                  * Indicates values subject to auto-interpretation  LV EF:  65    %    FINDINGS  Left Ventricle  Normal left ventricular chamber size. Mild concentric left ventricular   hypertrophy. Normal left ventricular systolic function. Normal regional   wall motion. Grade I diastolic dysfunction.    Right Ventricle  The right ventricle was normal in size and function.    Right Atrium  The right atrium is normal in size.  Normal inferior vena cava size and   inspiratory collapse.    Left Atrium  Mildly dilated left atrium. Left atrial volume index is 36 mL/sq m.    Mitral Valve  Structurally normal mitral valve without  significant stenosis. Trace   mitral regurgitation.    Aortic Valve  Structurally normal aortic valve without significant stenosis or   regurgitation.    Tricuspid Valve  Structurally normal tricuspid valve without significant stenosis. Trace   tricuspid regurgitation. Right ventricular systolic pressure is   estimated to be 25 mmHg.    Pulmonic Valve  Structurally normal pulmonic valve without significant stenosis or   regurgitation.    Pericardium  Normal pericardium without effusion.    Aorta  The aortic root is normal.                      Kerwin Hancock  (Electronically Signed)  Final Date:     15 August 2018                   12:43         LABORATORY  Lab Results   Component Value Date    SODIUM 141 08/15/2018    POTASSIUM 4.0 08/15/2018    CHLORIDE 111 08/15/2018    CO2 23 08/15/2018    GLUCOSE 90 08/15/2018    BUN 25 (H) 08/15/2018    CREATININE 1.06 08/15/2018        Lab Results   Component Value Date    WBC 7.1 08/15/2018    HEMOGLOBIN 12.8 (L) 08/15/2018    HEMATOCRIT 39.8 (L) 08/15/2018    PLATELETCT 205 08/15/2018        Total time of the discharge process exceeds 38 minutes.

## 2018-08-17 NOTE — DISCHARGE PLANNING
"Anticipated Discharge Disposition: SNF    Action: Received phone call from pt's wife Annie inquiring about pt's d/c plan. Annie states she's in [place] and was aware pt was traveling to [place]. Annie stated [facility] and [facility]. Informed pt's wife at this time there is no medical need that indicates a transfer to [facility], as pt is pending SNF placement. Annie stated pt was working with a \"Fatemeh\" at the [facility] who would be providing housing for pt once he arrived. Annie stating SW needs to get in contact with Fatemeh to make sure pt is discharged to [facility] home. Annie requested call back with information regarding d/c plan. LSW confirmed with pt SW can discuss his case with Annie.     LSW called Ava Garcia [phone] to discuss pt. Ava is not affiliated with [facility] and works for [employer] dept of  Services. She reports Fatemeh is her supervisor and is aware of pt but hasn't directly worked with him. Ava states pt used to live in [place], [place] several years ago and reached out to her to inquire about housing for veterans in the [place] area. Ava states she sent a form for pt to fill out so that he can be added to the interest list, since program isn't accepting any pt's until next year. Ava states pt filled this form out and sent it back. Pt then decided to come to [place] although housing wasn't secured. Ava states she has met with pt at bedside and informed him at this time they're unable to get him into the program. Ava requested referrals be sent to VA contracted facilities. [facility] has declined pt already and per previous SW notes pt is not service connected. Ava states she will continue to follow pt and try to assist with housing.     LSW met with pt at bedside. Pt expressed understanding in regards to his housing situation. Pt states he intends to stay in [place] and has sufficient income to afford a studio after d/c from " SNF. Pt states his income is around $2,300/month. Pt declined having any friends or relatives locally.     Barriers to Discharge: None    Plan: Pt accepted to [facility]. Pt states he's able to afford housing after d/c. Ava will continue to follow pt.

## 2018-08-17 NOTE — DISCHARGE PLANNING
Anticipated Discharge Disposition: SNF    Action: Pt discussed during rounds. MD states pt is cleared for SNF transfer.     Met with pt at bedside. Pt agreeable to [facility] and is aware Ava will continue following him. Pt states he also has a niece Nicky #[phone] who lives in [place], [place]. Pt states he's also able to move in with her if needed. She has already offered to take him in.     Called Ava to notify. Ava reassured SW she will continue assisting pt with housing and get him placed prior to his d/c from SNF.     Called pt's wife Annie to give her an update as she requested earlier today. Annie expressed frustration stating she doesn't know what pt was thinking when he decided to come to [place] without housing. Annie states they are estranged but is available if she's needed.     [facility] submitted #[id number]    Barriers to Discharge: None    Plan: Transfer to [facility]

## 2018-08-17 NOTE — DISCHARGE PLANNING
Agency/Facility Name: [facility]  Spoke To: Mikael  Outcome: [facility] has a bed however no transportation. Emailed transportation communication form to [facility].    Spoke to Radha @ [facility]    Transport is scheduled for [facility] @1500 going to [facility].    Job #[id number]

## 2018-08-17 NOTE — PROGRESS NOTES
Bedside report received, patient resting comfortably in bed. A&Ox4. No complaints at this time, denies any pain. Bed alarm on, call light within reach.

## 2018-08-17 NOTE — PROGRESS NOTES
Patient medically cleared for discharge. Discharged via [facility] transportation to [place]. Printed and reviewed AVS with patient, report called to Nieves Ponce RN. Peripheral IV and telemetry monitor removed. No other questions at this time.

## 2018-08-18 NOTE — DISCHARGE INSTRUCTIONS
Discharge Instructions    Discharged to other by [facility] with escort. Discharged via wheelchair, hospital escort: Yes.  Special equipment needed: Not Applicable    Be sure to schedule a follow-up appointment with your primary care doctor or any specialists as instructed.     Discharge Plan:   Diet Plan: Discussed  Activity Level: Discussed  Smoking Cessation Offered: Patient Counseled  Confirmed Follow up Appointment: Patient to Call and Schedule Appointment  Confirmed Symptoms Management: Discussed  Medication Reconciliation Updated: Yes  Pneumococcal Vaccine Administered/Refused: Not given - Patient refused pneumococcal vaccine  Influenza Vaccine Indication: Not indicated: Previously immunized this influenza season and > 8 years of age    I understand that a diet low in cholesterol, fat, and sodium is recommended for good health. Unless I have been given specific instructions below for another diet, I accept this instruction as my diet prescription.   Other diet: Regular    Special Instructions:     Stroke/CVA/TIA/Hemorrhagic Ischemia Discharge Instructions  You have had a stroke. Your risk factors have been identified as follows:  Age - Over 55  It is important that you reduce your risk factors to avoid another stroke in the future. Here are some general guidelines to follow:  · Eat healthy - avoid food high in fat.  · Get regular exercise.  · Maintain a healthy weight.  · Avoid smoking.  · Avoid alcohol and illegal drug use.  · Take your medications as directed.  For more information regarding risk factors, refer to pages 17-19 in your Stroke Patient Education Guide. Stroke Education Guide was given to patient.    Warning signs of a stroke include (which can also be found on page 3 of your Stroke Patient Education Guide):  · Sudden numbness of weakness of the face, arm or leg (especially on one side of the body).  · Sudden confusion, trouble speaking or understanding.  · Sudden trouble seeing in one or both  eyes.  · Sudden trouble walking, dizziness, loss of balance or coordination.  · Sudden severe headache with no known cause.  It is very important to get treatment quickly when a stroke occurs. If you experience any of the above warning signs, call 328 immediately.     Some patients who have had a stroke will be going home on a blood thinner medication called Warfarin (Coumadin).  This medication requires very close monitoring and follow up.  This follow up can be provided by either your Primary Care Physician or by [facility] Outpatient Anticoagulation Service.  The Outpatient Anticoagulation Service is located at the [facility] at [facility] ([facility]).  If you do not know when your follow up appointment is scheduled, call [phone] to verify your appointment time.      · Is patient discharged on Warfarin / Coumadin?   No     Depression / Suicide Risk    As you are discharged from this [facility], it is important to learn how to keep safe from harming yourself.    Recognize the warning signs:  · Abrupt changes in personality, positive or negative- including increase in energy   · Giving away possessions  · Change in eating patterns- significant weight changes-  positive or negative  · Change in sleeping patterns- unable to sleep or sleeping all the time   · Unwillingness or inability to communicate  · Depression  · Unusual sadness, discouragement and loneliness  · Talk of wanting to die  · Neglect of personal appearance   · Rebelliousness- reckless behavior  · Withdrawal from people/activities they love  · Confusion- inability to concentrate     If you or a loved one observes any of these behaviors or has concerns about self-harm, here's what you can do:  · Talk about it- your feelings and reasons for harming yourself  · Remove any means that you might use to hurt yourself (examples: pills, rope, extension cords, firearm)  · Get professional help  from the community (Mental Health, Substance Abuse, psychological counseling)  · Do not be alone:Call your Safe Contact- someone whom you trust who will be there for you.  · Call your local CRISIS HOTLINE 718-9818 or 684-703-4855  · Call your local Children's Mobile Crisis Response Team [place] (162) 568-6002 or www.Intellione  · Call the toll free National Suicide Prevention Hotlines   · National Suicide Prevention Lifeline 027-763-OKXJ (5653)  · National Hope Line Network 800-SUICIDE (515-2761)

## 2019-01-31 LAB — EKG IMPRESSION: NORMAL

## 2022-02-14 ENCOUNTER — APPOINTMENT (RX ONLY)
Dept: URBAN - METROPOLITAN AREA CLINIC 36 | Facility: CLINIC | Age: 87
Setting detail: DERMATOLOGY
End: 2022-02-14

## 2022-02-14 DIAGNOSIS — L21.8 OTHER SEBORRHEIC DERMATITIS: ICD-10-CM

## 2022-02-14 PROBLEM — C44.329 SQUAMOUS CELL CARCINOMA OF SKIN OF OTHER PARTS OF FACE: Status: ACTIVE | Noted: 2022-02-14

## 2022-02-14 PROBLEM — D48.5 NEOPLASM OF UNCERTAIN BEHAVIOR OF SKIN: Status: ACTIVE | Noted: 2022-02-14

## 2022-02-14 PROCEDURE — ? BIOPSY BY SHAVE METHOD WITH FROZEN SECTION

## 2022-02-14 PROCEDURE — 11102 TANGNTL BX SKIN SINGLE LES: CPT | Mod: 59

## 2022-02-14 PROCEDURE — ? MOHS SURGERY

## 2022-02-14 PROCEDURE — 17311 MOHS 1 STAGE H/N/HF/G: CPT

## 2022-02-14 PROCEDURE — 88331 PATH CONSLTJ SURG 1 BLK 1SPC: CPT | Mod: 59

## 2022-02-14 PROCEDURE — ? PRESCRIPTION

## 2022-02-14 PROCEDURE — 13132 CMPLX RPR F/C/C/M/N/AX/G/H/F: CPT | Mod: 59

## 2022-02-14 PROCEDURE — 99203 OFFICE O/P NEW LOW 30 MIN: CPT | Mod: 25

## 2022-02-14 RX ORDER — KETOCONAZOLE 20 MG/ML
SHAMPOO, SUSPENSION TOPICAL
Qty: 120 | Refills: 3 | COMMUNITY
Start: 2022-02-14

## 2022-02-14 RX ADMIN — KETOCONAZOLE: 20 SHAMPOO, SUSPENSION TOPICAL at 00:00

## 2022-02-14 NOTE — PROCEDURE: BIOPSY BY SHAVE METHOD WITH FROZEN SECTION
Detail Level: Detailed
Depth Of Biopsy: dermis
Biopsy Type: Frozen Section
Biopsy Method: 15 blade
Anesthesia Type: 1% lidocaine with epinephrine
Anesthesia Volume In Cc: 0.5
Additional Anesthesia Volume In Cc (Will Not Render If 0): 0
Hemostasis: Electrocautery
Wound Care: Petrolatum
Lab: [id number]
Lab Facility: 
Use Enhanced Frozen Section Features: Yes
Enhanced Features Information: The enhanced features will allow you to make use of the built in histology library. In this enhanced mode you will not have to select a frozen section diagnosis as this will automatically be based on the chosen impression. The parent diagnosis will also be overridden if you select a different microscopic description. The enhanced features will allow you develop a small library of gross descriptions to use as well. If you prefer the old method please leave the Use Enhanced Frozen Section Features question set to No.
Accession #: [id number]
Number Of Blocks: 1
Processing Note: The specimen was frozen in the cryostat, sectioned and stained.
Gross Description: Keratotic papule
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
Comment: Can not rule out underlying invasive carcinoma
Render Path Notes In Note?: No
Consent: Written consent was obtained and risks were reviewed including but not limited to scarring, infection, bleeding, scabbing, incomplete removal, nerve damage and allergy to anesthesia.
Post-Care Instructions: I reviewed with the patient in detail post-care instructions. Patient is to keep the biopsy site dry overnight, and then apply bacitracin twice daily until healed. Patient may apply hydrogen peroxide soaks to remove any crusting.
Notification Instructions: Patient will be notified of biopsy results. However, patient instructed to call the office if not contacted within 2 weeks.
Anticipated Plan (Based On Presumed Biopsy Results): Mohs
Bcc Histology Text: There were numerous aggregates of basaloid cells.
Bcc Infiltrative Histology Text: There were numerous aggregates of basaloid cells demonstrating an infiltrative pattern.
Bcc Micronodular Histology Text: The histologic sections demonstrate small nests of basaloid cells extending into the dermis.  A fibromyxoid stroma is present.
Bcc  Morpheaform/Sclerosing Histology Text: The histologic sections demonstrate spiky nests of basaloid cells extending into the dermis.  A fibromyxoid stroma is present.
Bcc  Nodular Histology Text: The histologic sections demonstrate nests of basaloid cells extending into the dermis.  A fibromyxoid stroma is present.
Scc Histology Text: The histologic sections demonstrate nests of atypical squamous cells extending into the dermis.
Scc Ka Subtype Histology Text: The lesion is composed of glassy islands of atypical squamous epithelium.  Neutrophilic microabscesses, eosinophils and elastic trapping are noted.
Scc In Situ Histology Text: The histologic sections demonstrate compact hyperkeratosis, hypergranulosis, koilocytosis and areas of full thickness atypia.
Billing Type: Third-Party Bill

## 2022-02-14 NOTE — PROCEDURE: MOHS SURGERY
Mid-Level Procedure Text (A): After obtaining clear surgical margins the patient was sent to a mid-level provider for surgical repair.  The patient understands they will receive post-surgical care and follow-up from the mid-level provider.
Nostril Rim Text: The closure involved the nostril rim.
Stage 9: Number Of Blocks?: 0
Information: Selecting Yes will display possible errors in your note based on the variables you have selected. This validation is only offered as a suggestion for you. PLEASE NOTE THAT THE VALIDATION TEXT WILL BE REMOVED WHEN YOU FINALIZE YOUR NOTE. IF YOU WANT TO FAX A PRELIMINARY NOTE YOU WILL NEED TO TOGGLE THIS TO 'NO' IF YOU DO NOT WANT IT IN YOUR FAXED NOTE.
Stage 4: Additional Anesthesia Type: 1% lidocaine with epinephrine
Show Mohs Stages In Case Summary (If You Hide Here Stages Will Be Calculated By Your Documentation In The Stages Tab)?: Yes
W Plasty Text: The lesion was extirpated to the level of the fat with a #15 scalpel blade.  Given the location of the defect, shape of the defect and the proximity to free margins a W-plasty was deemed most appropriate for repair.  Using a sterile surgical marker, the appropriate transposition arms of the W-plasty were drawn incorporating the defect and placing the expected incisions within the relaxed skin tension lines where possible.    The area thus outlined was incised deep to adipose tissue with a #15 scalpel blade.  The skin margins were undermined to an appropriate distance in all directions utilizing iris scissors.  The opposing transposition arms were then transposed into place in opposite direction and anchored with interrupted buried subcutaneous sutures.
Special Stains Stage 13 - Results: Base On Clearance Noted Above
Quadrant Reporting?: no
Nasal Turnover Hinge Flap Text: The defect edges were debeveled with a #15 scalpel blade.  Given the size, depth, location of the defect and the defect being full thickness a nasal turnover hinge flap was deemed most appropriate.  Using a sterile surgical marker, an appropriate hinge flap was drawn incorporating the defect. The area thus outlined was incised with a #15 scalpel blade. The flap was designed to recreate the nasal mucosal lining and the alar rim. The skin margins were undermined to an appropriate distance in all directions utilizing iris scissors.
Mucosal Advancement Flap Text: Given the location of the defect, shape of the defect and the proximity to free margins a mucosal advancement flap was deemed most appropriate. Incisions were made with a 15 blade scalpel in the appropriate fashion along the cutaneous vermilion border and the mucosal lip. The remaining actinically damaged mucosal tissue was excised.  The mucosal advancement flap was then elevated to the gingival sulcus with care taken to preserve the neurovascular structures and advanced into the primary defect. Care was taken to ensure that precise realignment of the vermilion border was achieved.
Purse String (Simple) Text: Given the location of the defect and the characteristics of the surrounding skin a purse string closure was deemed most appropriate.  Undermining was performed circumfirentially around the surgical defect.  A purse string suture was then placed and tightened.
Island Pedicle Flap With Canthal Suspension Text: The defect edges were debeveled with a #15 scalpel blade.  Given the location of the defect, shape of the defect and the proximity to free margins an island pedicle advancement flap was deemed most appropriate.  Using a sterile surgical marker, an appropriate advancement flap was drawn incorporating the defect, outlining the appropriate donor tissue and placing the expected incisions within the relaxed skin tension lines where possible. The area thus outlined was incised deep to adipose tissue with a #15 scalpel blade.  The skin margins were undermined to an appropriate distance in all directions around the primary defect and laterally outward around the island pedicle utilizing iris scissors.  There was minimal undermining beneath the pedicle flap. A suspension suture was placed in the canthal tendon to prevent tension and prevent ectropion.
Repair Hemostasis (Optional): Pinpoint electrocautery
Undermining Type: Entire Wound
Cheiloplasty (Complex) Text: A decision was made to reconstruct the defect with a  cheiloplasty.  The defect was undermined extensively.  Additional obicularis oris muscle was excised with a 15 blade scalpel.  The defect was converted into a full thickness wedge to facilite a better cosmetic result.  Small vessels were then tied off with 5-0 monocyrl. The obicularis oris, superficial fascia, adipose and dermis were then reapproximated.  After the deeper layers were approximated the epidermis was reapproximated with particular care given to realign the vermilion border.
Intermediate Repair Preamble Text (Leave Blank If You Do Not Want): Undermining was performed with blunt dissection.
Secondary Intention Text (Leave Blank If You Do Not Want): The defect will heal with secondary intention.
Retention Suture Text: Retention sutures were placed to support the closure and prevent dehiscence.
Anesthesia Volume In Cc: 6
Mohs Method Verbiage: An incision at a 45 degree angle following the standard Mohs approach was done and the specimen was harvested as a microscopic controlled layer.
Mart-1 - Negative Histology Text: MART-1 staining demonstrates a normal density and pattern of melanocytes along the dermal-epidermal junction. The surgical margins are negative for tumor cells.
Hemigard Postcare Instructions: The HEMIGARD strips are to remain completely dry for at least 5-7 days.
Stage 1: Number Of Blocks?: 1
Xenograft Text: The defect edges were debeveled with a #15 scalpel blade.  Given the location of the defect, shape of the defect and the proximity to free margins a xenograft was deemed most appropriate.  The graft was then trimmed to fit the size of the defect.  The graft was then placed in the primary defect and oriented appropriately.
Island Pedicle Flap-Requiring Vessel Identification Text: The defect edges were debeveled with a #15 scalpel blade.  Given the location of the defect, shape of the defect and the proximity to free margins an island pedicle advancement flap was deemed most appropriate.  Using a sterile surgical marker, an appropriate advancement flap was drawn, based on the axial vessel mentioned above, incorporating the defect, outlining the appropriate donor tissue and placing the expected incisions within the relaxed skin tension lines where possible.    The area thus outlined was incised deep to adipose tissue with a #15 scalpel blade.  The skin margins were undermined to an appropriate distance in all directions around the primary defect and laterally outward around the island pedicle utilizing iris scissors.  There was minimal undermining beneath the pedicle flap.
Home Suture Removal Text: Patient was provided instructions on removing sutures and will remove their sutures at home.  If they have any questions or difficulties they will call the office.
Primary Defect Width In Cm (Final Defect Size - Required For Flaps/Grafts): 1.3
No Residual Tumor Seen Histology Text: There were no malignant cells seen in the sections examined.
Bilobed Transposition Flap Text: The defect edges were debeveled with a #15 scalpel blade.  Given the location of the defect and the proximity to free margins a bilobed transposition flap was deemed most appropriate.  Using a sterile surgical marker, an appropriate bilobe flap drawn around the defect.    The area thus outlined was incised deep to adipose tissue with a #15 scalpel blade.  The skin margins were undermined to an appropriate distance in all directions utilizing iris scissors.
Consent (Lip)/Introductory Paragraph: The rationale for Mohs was explained to the patient and consent was obtained. The risks, benefits and alternatives to therapy were discussed in detail. Specifically, the risks of lip deformity, changes in the oral aperture, infection, scarring, bleeding, prolonged wound healing, incomplete removal, allergy to anesthesia, nerve injury and recurrence were addressed. Prior to the procedure, the treatment site was clearly identified and confirmed by the patient. All components of Universal Protocol/PAUSE Rule completed.
Plastic Surgeon Procedure Text (E): After obtaining clear surgical margins the patient was sent to plastics for surgical repair.  The patient understands they will receive post-surgical care and follow-up from the referring physician's office.
Banner Transposition Flap Text: The defect edges were debeveled with a #15 scalpel blade.  Given the location of the defect and the proximity to free margins a Banner transposition flap was deemed most appropriate.  Using a sterile surgical marker, an appropriate flap drawn around the defect. The area thus outlined was incised deep to adipose tissue with a #15 scalpel blade.  The skin margins were undermined to an appropriate distance in all directions utilizing iris scissors.
Zygomaticofacial Flap Text: Given the location of the defect, shape of the defect and the proximity to free margins a zygomaticofacial flap was deemed most appropriate for repair.  Using a sterile surgical marker, the appropriate flap was drawn incorporating the defect and placing the expected incisions within the relaxed skin tension lines where possible. The area thus outlined was incised deep to adipose tissue with a #15 scalpel blade with preservation of a vascular pedicle.  The skin margins were undermined to an appropriate distance in all directions utilizing iris scissors.  The flap was then placed into the defect and anchored with interrupted buried subcutaneous sutures.
Repair Performed By Another Provider Text (Leave Blank If You Do Not Want): After obtaining clear surgical margins the defect was repaired by another provider.
Mohs Case Number: [id number]
Inflammation Suggestive Of Cancer Camouflage Histology Text: There was a dense lymphocytic infiltrate which prevented adequate histologic evaluation of adjacent structures.
Consent (Nose)/Introductory Paragraph: The rationale for Mohs was explained to the patient and consent was obtained. The risks, benefits and alternatives to therapy were discussed in detail. Specifically, the risks of nasal deformity, changes in the flow of air through the nose, infection, scarring, bleeding, prolonged wound healing, incomplete removal, allergy to anesthesia, nerve injury and recurrence were addressed. Prior to the procedure, the treatment site was clearly identified and confirmed by the patient. All components of Universal Protocol/PAUSE Rule completed.
Advancement Flap (Single) Text: The defect edges were debeveled with a #15 scalpel blade.  Given the location of the defect and the proximity to free margins a single advancement flap was deemed most appropriate.  Using a sterile surgical marker, an appropriate advancement flap was drawn incorporating the defect and placing the expected incisions within the relaxed skin tension lines where possible.    The area thus outlined was incised deep to adipose tissue with a #15 scalpel blade.  The skin margins were undermined to an appropriate distance in all directions utilizing iris scissors.
Detail Level: Detailed
Rhomboid Transposition Flap Text: The defect edges were debeveled with a #15 scalpel blade.  Given the location of the defect and the proximity to free margins a rhomboid transposition flap was deemed most appropriate.  Using a sterile surgical marker, an appropriate rhomboid flap was drawn incorporating the defect.    The area thus outlined was incised deep to adipose tissue with a #15 scalpel blade.  The skin margins were undermined to an appropriate distance in all directions utilizing iris scissors.
Nasalis-Muscle-Based Myocutaneous Island Pedicle Flap Text: Using a #15 blade, an incision was made around the donor flap to the level of the nasalis muscle. Wide lateral undermining was then performed in both the subcutaneous plane above the nasalis muscle, and in a submuscular plane just above periosteum. This allowed the formation of a free nasalis muscle axial pedicle (based on the angular artery) which was still attached to the actual cutaneous flap, increasing its mobility and vascular viability. Hemostasis was obtained with pinpoint electrocoagulation. The flap was mobilized into position and the pivotal anchor points positioned and stabilized with buried interrupted sutures. Subcutaneous and dermal tissues were closed in a multilayered fashion with sutures. Tissue redundancies were excised, and the epidermal edges were apposed without significant tension and sutured with sutures.
Advancement-Rotation Flap Text: The defect edges were debeveled with a #15 scalpel blade.  Given the location of the defect, shape of the defect and the proximity to free margins an advancement-rotation flap was deemed most appropriate.  Using a sterile surgical marker, an appropriate flap was drawn incorporating the defect and placing the expected incisions within the relaxed skin tension lines where possible. The area thus outlined was incised deep to adipose tissue with a #15 scalpel blade.  The skin margins were undermined to an appropriate distance in all directions utilizing iris scissors.
Bcc Infiltrative Histology Text: There were numerous aggregates of basaloid cells demonstrating an infiltrative pattern.
Dorsal Nasal Flap Text: The defect edges were debeveled with a #15 scalpel blade.  Given the location of the defect and the proximity to free margins a dorsal nasal flap was deemed most appropriate.  Using a sterile surgical marker, an appropriate dorsal nasal flap was drawn around the defect.    The area thus outlined was incised deep to adipose tissue with a #15 scalpel blade.  The skin margins were undermined to an appropriate distance in all directions utilizing iris scissors.
O-L Flap Text: The defect edges were debeveled with a #15 scalpel blade.  Given the location of the defect, shape of the defect and the proximity to free margins an O-L flap was deemed most appropriate.  Using a sterile surgical marker, an appropriate advancement flap was drawn incorporating the defect and placing the expected incisions within the relaxed skin tension lines where possible.    The area thus outlined was incised deep to adipose tissue with a #15 scalpel blade.  The skin margins were undermined to an appropriate distance in all directions utilizing iris scissors.
Surgeon Performing Repair (Optional): Jenny Rojas MD
Crescentic Complex Repair Preamble Text (Leave Blank If You Do Not Want): Extensive wide undermining was performed.
Brow Lift Text: A midfrontal incision was made medially to the defect to allow access to the tissues just superior to the left eyebrow. Following careful dissection inferiorly in a supraperiosteal plane to the level of the left eyebrow, several 3-0 monocryl sutures were used to resuspend the eyebrow orbicularis oculi muscular unit to the superior frontal bone periosteum. This resulted in an appropriate reapproximation of static eyebrow symmetry and correction of the left brow ptosis.
Crescentic Advancement Flap Text: The defect edges were debeveled with a #15 scalpel blade.  Given the location of the defect and the proximity to free margins a crescentic advancement flap was deemed most appropriate.  Using a sterile surgical marker, the appropriate advancement flap was drawn incorporating the defect and placing the expected incisions within the relaxed skin tension lines where possible.    The area thus outlined was incised deep to adipose tissue with a #15 scalpel blade.  The skin margins were undermined to an appropriate distance in all directions utilizing iris scissors.
Oculoplastic Surgeon Procedure Text (E): After obtaining clear surgical margins the patient was sent to oculoplastics for surgical repair.  The patient understands they will receive post-surgical care and follow-up from the referring physician's office.
V-Y Flap Text: The defect edges were debeveled with a #15 scalpel blade.  Given the location of the defect, shape of the defect and the proximity to free margins a V-Y flap was deemed most appropriate.  Using a sterile surgical marker, an appropriate advancement flap was drawn incorporating the defect and placing the expected incisions within the relaxed skin tension lines where possible.    The area thus outlined was incised deep to adipose tissue with a #15 scalpel blade.  The skin margins were undermined to an appropriate distance in all directions utilizing iris scissors.
Advancement Flap (Double) Text: The defect edges were debeveled with a #15 scalpel blade.  Given the location of the defect and the proximity to free margins a double advancement flap was deemed most appropriate.  Using a sterile surgical marker, the appropriate advancement flaps were drawn incorporating the defect and placing the expected incisions within the relaxed skin tension lines where possible.    The area thus outlined was incised deep to adipose tissue with a #15 scalpel blade.  The skin margins were undermined to an appropriate distance in all directions utilizing iris scissors.
Mohs Histo Method Verbiage: Each section was then chromacoded and processed in the Mohs lab using the Mohs protocol and submitted for frozen section.
Cheiloplasty (Less Than 50%) Text: A decision was made to reconstruct the defect with a  cheiloplasty.  The defect was undermined extensively.  Additional obicularis oris muscle was excised with a 15 blade scalpel.  The defect was converted into a full thickness wedge, of less than 50% of the vertical height of the lip, to facilite a better cosmetic result.  Small vessels were then tied off with 5-0 monocyrl. The obicularis oris, superficial fascia, adipose and dermis were then reapproximated.  After the deeper layers were approximated the epidermis was reapproximated with particular care given to realign the vermilion border.
Epidermal Closure Graft Donor Site (Optional): running
Star Wedge Flap Text: The defect edges were debeveled with a #15 scalpel blade.  Given the location of the defect, shape of the defect and the proximity to free margins a star wedge flap was deemed most appropriate.  Using a sterile surgical marker, an appropriate rotation flap was drawn incorporating the defect and placing the expected incisions within the relaxed skin tension lines where possible. The area thus outlined was incised deep to adipose tissue with a #15 scalpel blade.  The skin margins were undermined to an appropriate distance in all directions utilizing iris scissors.
Donor Site Anesthesia Type: same as repair anesthesia
Bilobed Flap Text: The defect edges were debeveled with a #15 scalpel blade.  Given the location of the defect and the proximity to free margins a bilobe flap was deemed most appropriate.  Using a sterile surgical marker, an appropriate bilobe flap drawn around the defect.    The area thus outlined was incised deep to adipose tissue with a #15 scalpel blade.  The skin margins were undermined to an appropriate distance in all directions utilizing iris scissors.
Transposition Flap Text: The defect edges were debeveled with a #15 scalpel blade.  Given the location of the defect and the proximity to free margins a transposition flap was deemed most appropriate.  Using a sterile surgical marker, an appropriate transposition flap was drawn incorporating the defect.    The area thus outlined was incised deep to adipose tissue with a #15 scalpel blade.  The skin margins were undermined to an appropriate distance in all directions utilizing iris scissors.
Consent (Near Eyelid Margin)/Introductory Paragraph: The rationale for Mohs was explained to the patient and consent was obtained. The risks, benefits and alternatives to therapy were discussed in detail. Specifically, the risks of ectropion or eyelid deformity, infection, scarring, bleeding, prolonged wound healing, incomplete removal, allergy to anesthesia, nerve injury and recurrence were addressed. Prior to the procedure, the treatment site was clearly identified and confirmed by the patient. All components of Universal Protocol/PAUSE Rule completed.
Mart-1 - Positive Histology Text: MART-1 staining demonstrates areas of higher density and clustering of melanocytes with Pagetoid spread upwards within the epidermis. The surgical margins are positive for tumor cells.
Ftsg Text: The defect edges were debeveled with a #15 scalpel blade.  Given the location of the defect, shape of the defect and the proximity to free margins a full thickness skin graft was deemed most appropriate.  Using a sterile surgical marker, the primary defect shape was transferred to the donor site. The area thus outlined was incised deep to adipose tissue with a #15 scalpel blade.  The harvested graft was then trimmed of adipose tissue until only dermis and epidermis was left.  The skin margins of the secondary defect were undermined to an appropriate distance in all directions utilizing iris scissors.  The secondary defect was closed with interrupted buried subcutaneous sutures.  The skin edges were then re-apposed with running  sutures.  The skin graft was then placed in the primary defect and oriented appropriately.
Date Of Previous Biopsy (Optional): 12-3-21
Retention Suture Bite Size: 1 mm
Manual Repair Warning Statement: We plan on removing the manually selected variable below in favor of our much easier automatic structured text blocks found in the previous tab. We decided to do this to help make the flow better and give you the full power of structured data. Manual selection is never going to be ideal in our platform and I would encourage you to avoid using manual selection from this point on, especially since I will be sunsetting this feature. It is important that you do one of two things with the customized text below. First, you can save all of the text in a word file so you can have it for future reference. Second, transfer the text to the appropriate area in the Library tab. Lastly, if there is a flap or graft type which we do not have you need to let us know right away so I can add it in before the variable is hidden. No need to panic, we plan to give you roughly 6 months to make the change.
Is There Documentation In The Chart Showing Discussion Of Changes With Another Physician?: Please Select the Appropriate Response
Suture Removal: 7 days
Cheek Interpolation Flap Text: A decision was made to reconstruct the defect utilizing an interpolation axial flap and a staged reconstruction.  A telfa template was made of the defect.  This telfa template was then used to outline the Cheek Interpolation flap.  The donor area for the pedicle flap was then injected with anesthesia.  The flap was excised through the skin and subcutaneous tissue down to the layer of the underlying musculature.  The interpolation flap was carefully excised within this deep plane to maintain its blood supply.  The edges of the donor site were undermined.   The donor site was closed in a primary fashion.  The pedicle was then rotated into position and sutured.  Once the tube was sutured into place, adequate blood supply was confirmed with blanching and refill.  The pedicle was then wrapped with xeroform gauze and dressed appropriately with a telfa and gauze bandage to ensure continued blood supply and protect the attached pedicle.
O-T Plasty Text: The defect edges were debeveled with a #15 scalpel blade.  Given the location of the defect, shape of the defect and the proximity to free margins an O-T plasty was deemed most appropriate.  Using a sterile surgical marker, an appropriate O-T plasty was drawn incorporating the defect and placing the expected incisions within the relaxed skin tension lines where possible.    The area thus outlined was incised deep to adipose tissue with a #15 scalpel blade.  The skin margins were undermined to an appropriate distance in all directions utilizing iris scissors.
Muscle Hinge Flap Text: The defect edges were debeveled with a #15 scalpel blade.  Given the size, depth and location of the defect and the proximity to free margins a muscle hinge flap was deemed most appropriate.  Using a sterile surgical marker, an appropriate hinge flap was drawn incorporating the defect. The area thus outlined was incised with a #15 scalpel blade.  The skin margins were undermined to an appropriate distance in all directions utilizing iris scissors.
Previous Accession (Optional): outside path
Split-Thickness Skin Graft Text: The defect edges were debeveled with a #15 scalpel blade.  Given the location of the defect, shape of the defect and the proximity to free margins a split thickness skin graft was deemed most appropriate.  Using a sterile surgical marker, the primary defect shape was transferred to the donor site. The split thickness graft was then harvested.  The skin graft was then placed in the primary defect and oriented appropriately.
Otolaryngologist Procedure Text (C): After obtaining clear surgical margins the patient was sent to otolaryngology for surgical repair.  The patient understands they will receive post-surgical care and follow-up from the referring physician's office.
Wound Care (No Sutures): Petrolatum
Wound Check: 6 weeks
Subsequent Stages Histo Method Verbiage: Using a similar technique to that described above, a thin layer of tissue was removed from all areas where tumor was visible on the previous stage.  The tissue was again oriented, mapped, dyed, and processed as above.
O-Z Plasty Text: The defect edges were debeveled with a #15 scalpel blade.  Given the location of the defect, shape of the defect and the proximity to free margins an O-Z plasty (double transposition flap) was deemed most appropriate.  Using a sterile surgical marker, the appropriate transposition flaps were drawn incorporating the defect and placing the expected incisions within the relaxed skin tension lines where possible.    The area thus outlined was incised deep to adipose tissue with a #15 scalpel blade.  The skin margins were undermined to an appropriate distance in all directions utilizing iris scissors.  Hemostasis was achieved with electrocautery.  The flaps were then transposed into place, one clockwise and the other counterclockwise, and anchored with interrupted buried subcutaneous sutures.
Postop Diagnosis: same
Estimated Blood Loss (Cc): minimal
Primary Defect Length In Cm (Final Defect Size - Required For Flaps/Grafts): 1.7
Chonodrocutaneous Helical Advancement Flap Text: The defect edges were debeveled with a #15 scalpel blade.  Given the location of the defect and the proximity to free margins a chondrocutaneous helical advancement flap was deemed most appropriate.  Using a sterile surgical marker, the appropriate advancement flap was drawn incorporating the defect and placing the expected incisions within the relaxed skin tension lines where possible.    The area thus outlined was incised deep to adipose tissue with a #15 scalpel blade.  The skin margins were undermined to an appropriate distance in all directions utilizing iris scissors.
Ear Wedge Repair Text: A wedge excision was completed by carrying down an excision through the full thickness of the ear and cartilage with an inward facing Burow's triangle. The wound was then closed in a layered fashion.
Area H Indication Text: Tumors in this location are included in Area H (eyelids, eyebrows, nose, lips, chin, ear, pre-auricular, post-auricular, temple, genitalia, hands, feet, ankles and areola).  Tissue conservation is critical in these anatomic locations.
Skin Substitute Text: The defect edges were debeveled with a #15 scalpel blade.  Given the location of the defect, shape of the defect and the proximity to free margins a skin substitute graft was deemed most appropriate.  The graft material was trimmed to fit the size of the defect. The graft was then placed in the primary defect and oriented appropriately.
Island Pedicle Flap Text: The defect edges were debeveled with a #15 scalpel blade.  Given the location of the defect, shape of the defect and the proximity to free margins an island pedicle advancement flap was deemed most appropriate.  Using a sterile surgical marker, an appropriate advancement flap was drawn incorporating the defect, outlining the appropriate donor tissue and placing the expected incisions within the relaxed skin tension lines where possible.    The area thus outlined was incised deep to adipose tissue with a #15 scalpel blade.  The skin margins were undermined to an appropriate distance in all directions around the primary defect and laterally outward around the island pedicle utilizing iris scissors.  There was minimal undermining beneath the pedicle flap.
Staging Info: By selecting yes to the question above you will include information on AJCC 8 tumor staging in your Mohs note. Information on tumor staging will be automatically added for SCCs on the head and neck. AJCC 8 includes tumor size, tumor depth, perineural involvement and bone invasion.
Burow's Advancement Flap Text: The defect edges were debeveled with a #15 scalpel blade.  Given the location of the defect and the proximity to free margins a Burow's advancement flap was deemed most appropriate.  Using a sterile surgical marker, the appropriate advancement flap was drawn incorporating the defect and placing the expected incisions within the relaxed skin tension lines where possible.    The area thus outlined was incised deep to adipose tissue with a #15 scalpel blade.  The skin margins were undermined to an appropriate distance in all directions utilizing iris scissors.
Anesthesia Volume In Cc: 9
Wound Care: Vaseline
Consent 1/Introductory Paragraph: The rationale for Mohs was explained to the patient and consent was obtained. The risks, benefits and alternatives to therapy were discussed in detail. Specifically, the risks of infection, scarring, bleeding, prolonged wound healing, incomplete removal, allergy to anesthesia, nerve injury and recurrence were addressed. Prior to the procedure, the treatment site was clearly identified and confirmed by the patient. All components of Universal Protocol/PAUSE Rule completed.
Consent (Temporal Branch)/Introductory Paragraph: The rationale for Mohs was explained to the patient and consent was obtained. The risks, benefits and alternatives to therapy were discussed in detail. Specifically, the risks of damage to the temporal branch of the facial nerve, infection, scarring, bleeding, prolonged wound healing, incomplete removal, allergy to anesthesia, and recurrence were addressed. Prior to the procedure, the treatment site was clearly identified and confirmed by the patient. All components of Universal Protocol/PAUSE Rule completed.
Hatchet Flap Text: The defect edges were debeveled with a #15 scalpel blade.  Given the location of the defect, shape of the defect and the proximity to free margins a hatchet flap was deemed most appropriate.  Using a sterile surgical marker, an appropriate hatchet flap was drawn incorporating the defect and placing the expected incisions within the relaxed skin tension lines where possible.    The area thus outlined was incised deep to adipose tissue with a #15 scalpel blade.  The skin margins were undermined to an appropriate distance in all directions utilizing iris scissors.
Trilobed Flap Text: The defect edges were debeveled with a #15 scalpel blade.  Given the location of the defect and the proximity to free margins a trilobed flap was deemed most appropriate.  Using a sterile surgical marker, an appropriate trilobed flap drawn around the defect.    The area thus outlined was incised deep to adipose tissue with a #15 scalpel blade.  The skin margins were undermined to an appropriate distance in all directions utilizing iris scissors.
Hemigard Retention Suture: 0-0 Nylon
Epidermal Sutures: 5-0 Vicryl Rapide
Simple / Intermediate / Complex Repair - Final Wound Length In Cm: 2.9
Double O-Z Flap Text: The defect edges were debeveled with a #15 scalpel blade.  Given the location of the defect, shape of the defect and the proximity to free margins a Double O-Z flap was deemed most appropriate.  Using a sterile surgical marker, an appropriate transposition flap was drawn incorporating the defect and placing the expected incisions within the relaxed skin tension lines where possible. The area thus outlined was incised deep to adipose tissue with a #15 scalpel blade.  The skin margins were undermined to an appropriate distance in all directions utilizing iris scissors.
Orbicularis Oris Muscle Flap Text: The defect edges were debeveled with a #15 scalpel blade.  Given that the defect affected the competency of the oral sphincter an orbicularis oris muscle flap was deemed most appropriate to restore this competency and normal muscle function.  Using a sterile surgical marker, an appropriate flap was drawn incorporating the defect. The area thus outlined was incised with a #15 scalpel blade.
Graft Donor Site Bandage (Optional-Leave Blank If You Don't Want In Note): Aquaplast was fitted to the graft site and sewn into place. A pressure bandage were applied to the donor site and over the aquaplast bolster.
Area L Indication Text: Tumors in this location are included in Area L (trunk and extremities).  Mohs surgery is indicated for larger tumors, or tumors with aggressive histologic features, in these anatomic locations.
Rotation Flap Text: The defect edges were debeveled with a #15 scalpel blade.  Given the location of the defect, shape of the defect and the proximity to free margins a rotation flap was deemed most appropriate.  Using a sterile surgical marker, an appropriate rotation flap was drawn incorporating the defect and placing the expected incisions within the relaxed skin tension lines where possible.    The area thus outlined was incised deep to adipose tissue with a #15 scalpel blade.  The skin margins were undermined to an appropriate distance in all directions utilizing iris scissors.
Purse String (Intermediate) Text: Given the location of the defect and the characteristics of the surrounding skin a purse string intermediate closure was deemed most appropriate.  Undermining was performed circumfirentially around the surgical defect.  A purse string suture was then placed and tightened.
O-T Advancement Flap Text: The defect edges were debeveled with a #15 scalpel blade.  Given the location of the defect, shape of the defect and the proximity to free margins an O-T advancement flap was deemed most appropriate.  Using a sterile surgical marker, an appropriate advancement flap was drawn incorporating the defect and placing the expected incisions within the relaxed skin tension lines where possible.    The area thus outlined was incised deep to adipose tissue with a #15 scalpel blade.  The skin margins were undermined to an appropriate distance in all directions utilizing iris scissors.
Eye Protection Verbiage: Before proceeding with the stage, a plastic scleral shield was inserted. The globe was anesthetized with a few drops of 1% lidocaine with 1:100,000 epinephrine. Then, an appropriate sized scleral shield was chosen and coated with lacrilube ointment. The shield was gently inserted and left in place for the duration of each stage. After the stage was completed, the shield was gently removed.
O-Z Flap Text: The defect edges were debeveled with a #15 scalpel blade.  Given the location of the defect, shape of the defect and the proximity to free margins an O-Z flap was deemed most appropriate.  Using a sterile surgical marker, an appropriate transposition flap was drawn incorporating the defect and placing the expected incisions within the relaxed skin tension lines where possible. The area thus outlined was incised deep to adipose tissue with a #15 scalpel blade.  The skin margins were undermined to an appropriate distance in all directions utilizing iris scissors.
Melolabial Interpolation Flap Text: A decision was made to reconstruct the defect utilizing an interpolation axial flap and a staged reconstruction.  A telfa template was made of the defect.  This telfa template was then used to outline the melolabial interpolation flap.  The donor area for the pedicle flap was then injected with anesthesia.  The flap was excised through the skin and subcutaneous tissue down to the layer of the underlying musculature.  The pedicle flap was carefully excised within this deep plane to maintain its blood supply.  The edges of the donor site were undermined.   The donor site was closed in a primary fashion.  The pedicle was then rotated into position and sutured.  Once the tube was sutured into place, adequate blood supply was confirmed with blanching and refill.  The pedicle was then wrapped with xeroform gauze and dressed appropriately with a telfa and gauze bandage to ensure continued blood supply and protect the attached pedicle.
Closure 3 Information: This tab is for additional flaps and grafts above and beyond our usual structured repairs.  Please note if you enter information here it will not currently bill and you will need to add the billing information manually.
Ear Star Wedge Flap Text: The defect edges were debeveled with a #15 blade scalpel.  Given the location of the defect and the proximity to free margins (helical rim) an ear star wedge flap was deemed most appropriate.  Using a sterile surgical marker, the appropriate flap was drawn incorporating the defect and placing the expected incisions between the helical rim and antihelix where possible.  The area thus outlined was incised through and through with a #15 scalpel blade.
Suturegard Body: The suture ends were repeatedly re-tightened and re-clamped to achieve the desired tissue expansion.
Debridement Text: The wound edges were debrided prior to proceeding with the closure to facilitate wound healing.
Bi-Rhombic Flap Text: The defect edges were debeveled with a #15 scalpel blade.  Given the location of the defect and the proximity to free margins a bi-rhombic flap was deemed most appropriate.  Using a sterile surgical marker, an appropriate rhombic flap was drawn incorporating the defect. The area thus outlined was incised deep to adipose tissue with a #15 scalpel blade.  The skin margins were undermined to an appropriate distance in all directions utilizing iris scissors.
V-Y Plasty Text: The defect edges were debeveled with a #15 scalpel blade.  Given the location of the defect, shape of the defect and the proximity to free margins an V-Y advancement flap was deemed most appropriate.  Using a sterile surgical marker, an appropriate advancement flap was drawn incorporating the defect and placing the expected incisions within the relaxed skin tension lines where possible.    The area thus outlined was incised deep to adipose tissue with a #15 scalpel blade.  The skin margins were undermined to an appropriate distance in all directions utilizing iris scissors.
Repair Anesthesia Method: local infiltration
Mastoid Interpolation Flap Text: A decision was made to reconstruct the defect utilizing an interpolation axial flap and a staged reconstruction.  A telfa template was made of the defect.  This telfa template was then used to outline the mastoid interpolation flap.  The donor area for the pedicle flap was then injected with anesthesia.  The flap was excised through the skin and subcutaneous tissue down to the layer of the underlying musculature.  The pedicle flap was carefully excised within this deep plane to maintain its blood supply.  The edges of the donor site were undermined.   The donor site was closed in a primary fashion.  The pedicle was then rotated into position and sutured.  Once the tube was sutured into place, adequate blood supply was confirmed with blanching and refill.  The pedicle was then wrapped with xeroform gauze and dressed appropriately with a telfa and gauze bandage to ensure continued blood supply and protect the attached pedicle.
Posterior Auricular Interpolation Flap Text: A decision was made to reconstruct the defect utilizing an interpolation axial flap and a staged reconstruction.  A telfa template was made of the defect.  This telfa template was then used to outline the posterior auricular interpolation flap.  The donor area for the pedicle flap was then injected with anesthesia.  The flap was excised through the skin and subcutaneous tissue down to the layer of the underlying musculature.  The pedicle flap was carefully excised within this deep plane to maintain its blood supply.  The edges of the donor site were undermined.   The donor site was closed in a primary fashion.  The pedicle was then rotated into position and sutured.  Once the tube was sutured into place, adequate blood supply was confirmed with blanching and refill.  The pedicle was then wrapped with xeroform gauze and dressed appropriately with a telfa and gauze bandage to ensure continued blood supply and protect the attached pedicle.
Interpolation Flap Text: A decision was made to reconstruct the defect utilizing an interpolation axial flap and a staged reconstruction.  A telfa template was made of the defect.  This telfa template was then used to outline the interpolation flap.  The donor area for the pedicle flap was then injected with anesthesia.  The flap was excised through the skin and subcutaneous tissue down to the layer of the underlying musculature.  The interpolation flap was carefully excised within this deep plane to maintain its blood supply.  The edges of the donor site were undermined.   The donor site was closed in a primary fashion.  The pedicle was then rotated into position and sutured.  Once the tube was sutured into place, adequate blood supply was confirmed with blanching and refill.  The pedicle was then wrapped with xeroform gauze and dressed appropriately with a telfa and gauze bandage to ensure continued blood supply and protect the attached pedicle.
Referring Physician (Optional): [facility]
Alar Island Pedicle Flap Text: The defect edges were debeveled with a #15 scalpel blade.  Given the location of the defect, shape of the defect and the proximity to the alar rim an island pedicle advancement flap was deemed most appropriate.  Using a sterile surgical marker, an appropriate advancement flap was drawn incorporating the defect, outlining the appropriate donor tissue and placing the expected incisions within the nasal ala running parallel to the alar rim. The area thus outlined was incised with a #15 scalpel blade.  The skin margins were undermined minimally to an appropriate distance in all directions around the primary defect and laterally outward around the island pedicle utilizing iris scissors.  There was minimal undermining beneath the pedicle flap.
Pain Refusal Text: I offered to prescribe pain medication but the patient refused to take this medication.
Location Indication Override (Is Already Calculated Based On Selected Body Location): Area H
Consent Type: Consent 1 (Standard)
Asc Procedure Text (B): After obtaining clear surgical margins the patient was sent to an ASC for surgical repair.  The patient understands they will receive post-surgical care and follow-up from the ASC physician.
Initial Size Of Lesion: 1.1
Partial Purse String (Intermediate) Text: Given the location of the defect and the characteristics of the surrounding skin an intermediate purse string closure was deemed most appropriate.  Undermining was performed circumfirentially around the surgical defect.  A purse string suture was then placed and tightened. Wound tension only allowed a partial closure of the circular defect.
X Size Of Lesion In Cm (Optional): 0.9
Suturegard Intro: Intraoperative tissue expansion was performed, utilizing the SUTUREGARD device, in order to reduce wound tension.
Epidermal Closure: running and interrupted
Consent (Scalp)/Introductory Paragraph: The rationale for Mohs was explained to the patient and consent was obtained. The risks, benefits and alternatives to therapy were discussed in detail. Specifically, the risks of changes in hair growth pattern secondary to repair, infection, scarring, bleeding, prolonged wound healing, incomplete removal, allergy to anesthesia, nerve injury and recurrence were addressed. Prior to the procedure, the treatment site was clearly identified and confirmed by the patient. All components of Universal Protocol/PAUSE Rule completed.
Unna Boot Text: An Unna boot was placed to help immobilize the limb and facilitate more rapid healing.
Bilateral Helical Rim Advancement Flap Text: The defect edges were debeveled with a #15 blade scalpel.  Given the location of the defect and the proximity to free margins (helical rim) a bilateral helical rim advancement flap was deemed most appropriate.  Using a sterile surgical marker, the appropriate advancement flaps were drawn incorporating the defect and placing the expected incisions between the helical rim and antihelix where possible.  The area thus outlined was incised through and through with a #15 scalpel blade.  With a skin hook and iris scissors, the flaps were gently and sharply undermined and freed up.
Dressing (No Sutures): pressure dressing with telfa
Vermilion Border Text: The closure involved the vermilion border.
Paramedian Forehead Flap Text: A decision was made to reconstruct the defect utilizing an interpolation axial flap and a staged reconstruction.  A telfa template was made of the defect.  This telfa template was then used to outline the paramedian forehead pedicle flap.  The donor area for the pedicle flap was then injected with anesthesia.  The flap was excised through the skin and subcutaneous tissue down to the layer of the underlying musculature.  The pedicle flap was carefully excised within this deep plane to maintain its blood supply.  The edges of the donor site were undermined.   The donor site was closed in a primary fashion.  The pedicle was then rotated into position and sutured.  Once the tube was sutured into place, adequate blood supply was confirmed with blanching and refill.  The pedicle was then wrapped with xeroform gauze and dressed appropriately with a telfa and gauze bandage to ensure continued blood supply and protect the attached pedicle.
S Plasty Text: Given the location and shape of the defect, and the orientation of relaxed skin tension lines, an S-plasty was deemed most appropriate for repair.  Using a sterile surgical marker, the appropriate outline of the S-plasty was drawn, incorporating the defect and placing the expected incisions within the relaxed skin tension lines where possible.  The area thus outlined was incised deep to adipose tissue with a #15 scalpel blade.  The skin margins were undermined to an appropriate distance in all directions utilizing iris scissors. The skin flaps were advanced over the defect.  The opposing margins were then approximated with interrupted buried subcutaneous sutures.
Double O-Z Plasty Text: The defect edges were debeveled with a #15 scalpel blade.  Given the location of the defect, shape of the defect and the proximity to free margins a Double O-Z plasty (double transposition flap) was deemed most appropriate.  Using a sterile surgical marker, the appropriate transposition flaps were drawn incorporating the defect and placing the expected incisions within the relaxed skin tension lines where possible. The area thus outlined was incised deep to adipose tissue with a #15 scalpel blade.  The skin margins were undermined to an appropriate distance in all directions utilizing iris scissors.  Hemostasis was achieved with electrocautery.  The flaps were then transposed into place, one clockwise and the other counterclockwise, and anchored with interrupted buried subcutaneous sutures.
Modified Advancement Flap Text: The defect edges were debeveled with a #15 scalpel blade.  Given the location of the defect, shape of the defect and the proximity to free margins a modified advancement flap was deemed most appropriate.  Using a sterile surgical marker, an appropriate advancement flap was drawn incorporating the defect and placing the expected incisions within the relaxed skin tension lines where possible.    The area thus outlined was incised deep to adipose tissue with a #15 scalpel blade.  The skin margins were undermined to an appropriate distance in all directions utilizing iris scissors.
Consent 3/Introductory Paragraph: I gave the patient a chance to ask questions they had about the procedure.  Following this I explained the Mohs procedure and consent was obtained. The risks, benefits and alternatives to therapy were discussed in detail. Specifically, the risks of infection, scarring, bleeding, prolonged wound healing, incomplete removal, allergy to anesthesia, nerve injury and recurrence were addressed. Prior to the procedure, the treatment site was clearly identified and confirmed by the patient. All components of Universal Protocol/PAUSE Rule completed.
Tarsorrhaphy Text: A tarsorrhaphy was performed using Frost sutures.
Tumor Depth: Less than 6mm from granular layer and no invasion beyond the subcutaneous fat
A-T Advancement Flap Text: The defect edges were debeveled with a #15 scalpel blade.  Given the location of the defect, shape of the defect and the proximity to free margins an A-T advancement flap was deemed most appropriate.  Using a sterile surgical marker, an appropriate advancement flap was drawn incorporating the defect and placing the expected incisions within the relaxed skin tension lines where possible.    The area thus outlined was incised deep to adipose tissue with a #15 scalpel blade.  The skin margins were undermined to an appropriate distance in all directions utilizing iris scissors.
Double Island Pedicle Flap Text: The defect edges were debeveled with a #15 scalpel blade.  Given the location of the defect, shape of the defect and the proximity to free margins a double island pedicle advancement flap was deemed most appropriate.  Using a sterile surgical marker, an appropriate advancement flap was drawn incorporating the defect, outlining the appropriate donor tissue and placing the expected incisions within the relaxed skin tension lines where possible.    The area thus outlined was incised deep to adipose tissue with a #15 scalpel blade.  The skin margins were undermined to an appropriate distance in all directions around the primary defect and laterally outward around the island pedicle utilizing iris scissors.  There was minimal undermining beneath the pedicle flap.
Partial Purse String (Simple) Text: Given the location of the defect and the characteristics of the surrounding skin a simple purse string closure was deemed most appropriate.  Undermining was performed circumfirentially around the surgical defect.  A purse string suture was then placed and tightened. Wound tension only allowed a partial closure of the circular defect.
Hemigard Intro: Due to skin fragility and wound tension, it was decided to use HEMIGARD adhesive retention suture devices to permit a linear closure. The skin was cleaned and dried for a 6cm distance away from the wound. Excessive hair, if present, was removed to allow for adhesion.
Complex Repair And Graft Additional Text (Will Appearing After The Standard Complex Repair Text): The complex repair was not sufficient to completely close the primary defect. The remaining additional defect was repaired with the graft mentioned below.
Cheek-To-Nose Interpolation Flap Text: A decision was made to reconstruct the defect utilizing an interpolation axial flap and a staged reconstruction.  A telfa template was made of the defect.  This telfa template was then used to outline the Cheek-To-Nose Interpolation flap.  The donor area for the pedicle flap was then injected with anesthesia.  The flap was excised through the skin and subcutaneous tissue down to the layer of the underlying musculature.  The interpolation flap was carefully excised within this deep plane to maintain its blood supply.  The edges of the donor site were undermined.   The donor site was closed in a primary fashion.  The pedicle was then rotated into position and sutured.  Once the tube was sutured into place, adequate blood supply was confirmed with blanching and refill.  The pedicle was then wrapped with xeroform gauze and dressed appropriately with a telfa and gauze bandage to ensure continued blood supply and protect the attached pedicle.
Spiral Flap Text: The defect edges were debeveled with a #15 scalpel blade.  Given the location of the defect, shape of the defect and the proximity to free margins a spiral flap was deemed most appropriate.  Using a sterile surgical marker, an appropriate rotation flap was drawn incorporating the defect and placing the expected incisions within the relaxed skin tension lines where possible. The area thus outlined was incised deep to adipose tissue with a #15 scalpel blade.  The skin margins were undermined to an appropriate distance in all directions utilizing iris scissors.
Same Histology In Subsequent Stages Text: The pattern and morphology of the tumor is as described in the first stage.
Body Location Override (Optional - Billing Will Still Be Based On Selected Body Map Location If Applicable): left cheek
Graft Donor Site Dermal Sutures (Optional): 5-0 Polysorb
Post-Care Instructions: I reviewed with the patient in detail post-care instructions. Patient is not to engage in any heavy lifting, exercise, or swimming for the next 14 days. Should the patient develop any fevers, chills, bleeding, severe pain patient will contact the office immediately.
H Plasty Text: Given the location of the defect, shape of the defect and the proximity to free margins a H-plasty was deemed most appropriate for repair.  Using a sterile surgical marker, the appropriate advancement arms of the H-plasty were drawn incorporating the defect and placing the expected incisions within the relaxed skin tension lines where possible. The area thus outlined was incised deep to adipose tissue with a #15 scalpel blade. The skin margins were undermined to an appropriate distance in all directions utilizing iris scissors.  The opposing advancement arms were then advanced into place in opposite direction and anchored with interrupted buried subcutaneous sutures.
Consent (Marginal Mandibular)/Introductory Paragraph: The rationale for Mohs was explained to the patient and consent was obtained. The risks, benefits and alternatives to therapy were discussed in detail. Specifically, the risks of damage to the marginal mandibular branch of the facial nerve, infection, scarring, bleeding, prolonged wound healing, incomplete removal, allergy to anesthesia, and recurrence were addressed. Prior to the procedure, the treatment site was clearly identified and confirmed by the patient. All components of Universal Protocol/PAUSE Rule completed.
Graft Cartilage Fenestration Text: The cartilage was fenestrated with a 2mm punch biopsy to help facilitate graft survival and healing.
Burow's Graft Text: The defect edges were debeveled with a #15 scalpel blade.  Given the location of the defect, shape of the defect, the proximity to free margins and the presence of a standing cone deformity a Burow's skin graft was deemed most appropriate. The standing cone was removed and this tissue was then trimmed to the shape of the primary defect. The adipose tissue was also removed until only dermis and epidermis were left.  The skin margins of the secondary defect were undermined to an appropriate distance in all directions utilizing iris scissors.  The secondary defect was closed with interrupted buried subcutaneous sutures.  The skin edges were then re-apposed with running  sutures.  The skin graft was then placed in the primary defect and oriented appropriately.
Mercedes Flap Text: The defect edges were debeveled with a #15 scalpel blade.  Given the location of the defect, shape of the defect and the proximity to free margins a Mercedes flap was deemed most appropriate.  Using a sterile surgical marker, an appropriate advancement flap was drawn incorporating the defect and placing the expected incisions within the relaxed skin tension lines where possible. The area thus outlined was incised deep to adipose tissue with a #15 scalpel blade.  The skin margins were undermined to an appropriate distance in all directions utilizing iris scissors.
Mauc Instructions: By selecting yes to the question below the MAUC number will be added into the note.  This will be calculated automatically based on the diagnosis chosen, the size entered, the body zone selected (H,M,L) and the specific indications you chose. You will also have the option to override the Mohs AUC if you disagree with the automatically calculated number and this option is found in the Case Summary tab.
Composite Graft Text: The defect edges were debeveled with a #15 scalpel blade.  Given the location of the defect, shape of the defect, the proximity to free margins and the fact the defect was full thickness a composite graft was deemed most appropriate.  The defect was outline and then transferred to the donor site.  A full thickness graft was then excised from the donor site. The graft was then placed in the primary defect, oriented appropriately and then sutured into place.  The secondary defect was then repaired using a primary closure.
Complex Repair And Flap Additional Text (Will Appearing After The Standard Complex Repair Text): The complex repair was not sufficient to completely close the primary defect. The remaining additional defect was repaired with the flap mentioned below.
Mohs Rapid Report Verbiage: The area of clinically evident tumor was marked with skin marking ink and appropriately hatched.  The initial incision was made following the Mohs approach through the skin.  The specimen was taken to the lab, divided into the necessary number of pieces, chromacoded and processed according to the Mohs protocol.  This was repeated in successive stages until a tumor free defect was achieved.
Alternatives Discussed Intro (Do Not Add Period): I discussed alternative treatments to Mohs surgery and specifically discussed the risks and benefits of
Consent (Ear)/Introductory Paragraph: The rationale for Mohs was explained to the patient and consent was obtained. The risks, benefits and alternatives to therapy were discussed in detail. Specifically, the risks of ear deformity, infection, scarring, bleeding, prolonged wound healing, incomplete removal, allergy to anesthesia, nerve injury and recurrence were addressed. Prior to the procedure, the treatment site was clearly identified and confirmed by the patient. All components of Universal Protocol/PAUSE Rule completed.
Surgeon/Pathologist Verbiage (Will Incorporate Name Of Surgeon From Intro If Not Blank): operated in two distinct and integrated capacities as the surgeon and pathologist.
Medical Necessity Statement: Based on my medical judgement, Mohs surgery is the most appropriate treatment for this cancer compared to other treatments.
Cartilage Graft Text: The defect edges were debeveled with a #15 scalpel blade.  Given the location of the defect, shape of the defect, the fact the defect involved a full thickness cartilage defect a cartilage graft was deemed most appropriate.  An appropriate donor site was identified, cleansed, and anesthetized. The cartilage graft was then harvested and transferred to the recipient site, oriented appropriately and then sutured into place.  The secondary defect was then repaired using a primary closure.
Keystone Flap Text: The defect edges were debeveled with a #15 scalpel blade.  Given the location of the defect, shape of the defect a keystone flap was deemed most appropriate.  Using a sterile surgical marker, an appropriate keystone flap was drawn incorporating the defect, outlining the appropriate donor tissue and placing the expected incisions within the relaxed skin tension lines where possible. The area thus outlined was incised deep to adipose tissue with a #15 scalpel blade.  The skin margins were undermined to an appropriate distance in all directions around the primary defect and laterally outward around the flap utilizing iris scissors.
Repair Type: Complex Repair
Hemostasis: Electrocautery
Tissue Cultured Epidermal Autograft Text: The defect edges were debeveled with a #15 scalpel blade.  Given the location of the defect, shape of the defect and the proximity to free margins a tissue cultured epidermal autograft was deemed most appropriate.  The graft was then trimmed to fit the size of the defect.  The graft was then placed in the primary defect and oriented appropriately.
Dermal Autograft Text: The defect edges were debeveled with a #15 scalpel blade.  Given the location of the defect, shape of the defect and the proximity to free margins a dermal autograft was deemed most appropriate.  Using a sterile surgical marker, the primary defect shape was transferred to the donor site. The area thus outlined was incised deep to adipose tissue with a #15 scalpel blade.  The harvested graft was then trimmed of adipose and epidermal tissue until only dermis was left.  The skin graft was then placed in the primary defect and oriented appropriately.
Localized Dermabrasion With Wire Brush Text: The patient was draped in routine manner.  Localized dermabrasion using 3 x 17 mm wire brush was performed in routine manner to papillary dermis. This spot dermabrasion is being performed to complete skin cancer reconstruction. It also will eliminate the other sun damaged precancerous cells that are known to be part of the regional effect of a lifetime's worth of sun exposure. This localized dermabrasion is therapeutic and should not be considered cosmetic in any regard.
Tumor Debulked?: curette
Bcc Histology Text: There were numerous aggregates of basaloid cells.
Helical Rim Text: The closure involved the helical rim.
Undermining Location (Optional): in the superficial subcutaneous fat
Full Thickness Lip Wedge Repair (Flap) Text: Given the location of the defect and the proximity to free margins a full thickness wedge repair was deemed most appropriate.  Using a sterile surgical marker, the appropriate repair was drawn incorporating the defect and placing the expected incisions perpendicular to the vermilion border.  The vermilion border was also meticulously outlined to ensure appropriate reapproximation during the repair.  The area thus outlined was incised through and through with a #15 scalpel blade.  The muscularis and dermis were reaproximated with deep sutures following hemostasis. Care was taken to realign the vermilion border before proceeding with the superficial closure.  Once the vermilion was realigned the superfical and mucosal closure was finished.
Helical Rim Advancement Flap Text: The defect edges were debeveled with a #15 blade scalpel.  Given the location of the defect and the proximity to free margins (helical rim) a double helical rim advancement flap was deemed most appropriate.  Using a sterile surgical marker, the appropriate advancement flaps were drawn incorporating the defect and placing the expected incisions between the helical rim and antihelix where possible.  The area thus outlined was incised through and through with a #15 scalpel blade.  With a skin hook and iris scissors, the flaps were gently and sharply undermined and freed up.
Biopsy Photograph Reviewed: No (no photograph available)
Z Plasty Text: The lesion was extirpated to the level of the fat with a #15 scalpel blade.  Given the location of the defect, shape of the defect and the proximity to free margins a Z-plasty was deemed most appropriate for repair.  Using a sterile surgical marker, the appropriate transposition arms of the Z-plasty were drawn incorporating the defect and placing the expected incisions within the relaxed skin tension lines where possible.    The area thus outlined was incised deep to adipose tissue with a #15 scalpel blade.  The skin margins were undermined to an appropriate distance in all directions utilizing iris scissors.  The opposing transposition arms were then transposed into place in opposite direction and anchored with interrupted buried subcutaneous sutures.
Where Do You Want The Question To Include Opioid Counseling Located?: Case Summary Tab
No Repair - Repaired With Adjacent Surgical Defect Text (Leave Blank If You Do Not Want): After obtaining clear surgical margins the defect was repaired concurrently with another surgical defect which was in close approximation.
Peng Advancement Flap Text: The defect edges were debeveled with a #15 scalpel blade.  Given the location of the defect, shape of the defect and the proximity to free margins a Peng advancement flap was deemed most appropriate.  Using a sterile surgical marker, an appropriate advancement flap was drawn incorporating the defect and placing the expected incisions within the relaxed skin tension lines where possible. The area thus outlined was incised deep to adipose tissue with a #15 scalpel blade.  The skin margins were undermined to an appropriate distance in all directions utilizing iris scissors.
Non-Graft Cartilage Fenestration Text: The cartilage was fenestrated with a 2mm punch biopsy to help facilitate healing.
Consent 2/Introductory Paragraph: Mohs surgery was explained to the patient and consent was obtained. The risks, benefits and alternatives to therapy were discussed in detail. Specifically, the risks of infection, scarring, bleeding, prolonged wound healing, incomplete removal, allergy to anesthesia, nerve injury and recurrence were addressed. Prior to the procedure, the treatment site was clearly identified and confirmed by the patient. All components of Universal Protocol/PAUSE Rule completed.
Rhombic Flap Text: The defect edges were debeveled with a #15 scalpel blade.  Given the location of the defect and the proximity to free margins a rhombic flap was deemed most appropriate.  Using a sterile surgical marker, an appropriate rhombic flap was drawn incorporating the defect.    The area thus outlined was incised deep to adipose tissue with a #15 scalpel blade.  The skin margins were undermined to an appropriate distance in all directions utilizing iris scissors.
Melolabial Transposition Flap Text: The defect edges were debeveled with a #15 scalpel blade.  Given the location of the defect and the proximity to free margins a melolabial flap was deemed most appropriate.  Using a sterile surgical marker, an appropriate melolabial transposition flap was drawn incorporating the defect.    The area thus outlined was incised deep to adipose tissue with a #15 scalpel blade.  The skin margins were undermined to an appropriate distance in all directions utilizing iris scissors.
Deep Sutures: 5-0 Maxon
Consent (Spinal Accessory)/Introductory Paragraph: The rationale for Mohs was explained to the patient and consent was obtained. The risks, benefits and alternatives to therapy were discussed in detail. Specifically, the risks of damage to the spinal accessory nerve, infection, scarring, bleeding, prolonged wound healing, incomplete removal, allergy to anesthesia, and recurrence were addressed. Prior to the procedure, the treatment site was clearly identified and confirmed by the patient. All components of Universal Protocol/PAUSE Rule completed.
Closure 2 Information: This tab is for additional flaps and grafts, including complex repair and grafts and complex repair and flaps. You can also specify a different location for the additional defect, if the location is the same you do not need to select a new one. We will insert the automated text for the repair you select below just as we do for solitary flaps and grafts. Please note that at this time if you select a location with a different insurance zone you will need to override the ICD10 and CPT if appropriate.
Epidermal Autograft Text: The defect edges were debeveled with a #15 scalpel blade.  Given the location of the defect, shape of the defect and the proximity to free margins an epidermal autograft was deemed most appropriate.  Using a sterile surgical marker, the primary defect shape was transferred to the donor site. The epidermal graft was then harvested.  The skin graft was then placed in the primary defect and oriented appropriately.
Area M Indication Text: Tumors in this location are included in Area M (cheek, forehead, scalp, neck, jawline and pretibial skin).  Mohs surgery is indicated for tumors in these anatomic locations.
Graft Donor Site Epidermal Sutures (Optional): 5-0 Surgipro
Staged Advancement Flap Text: The defect edges were debeveled with a #15 scalpel blade.  Given the location of the defect, shape of the defect and the proximity to free margins a staged advancement flap was deemed most appropriate.  Using a sterile surgical marker, an appropriate advancement flap was drawn incorporating the defect and placing the expected incisions within the relaxed skin tension lines where possible. The area thus outlined was incised deep to adipose tissue with a #15 scalpel blade.  The skin margins were undermined to an appropriate distance in all directions utilizing iris scissors.
Adjacent Tissue Transfer Text: The defect edges were debeveled with a #15 scalpel blade.  Given the location of the defect and the proximity to free margins an adjacent tissue transfer was deemed most appropriate.  Using a sterile surgical marker, an appropriate flap was drawn incorporating the defect and placing the expected incisions within the relaxed skin tension lines where possible.    The area thus outlined was incised deep to adipose tissue with a #15 scalpel blade.  The skin margins were undermined to an appropriate distance in all directions utilizing iris scissors.
Mustarde Flap Text: The defect edges were debeveled with a #15 scalpel blade.  Given the size, depth and location of the defect and the proximity to free margins a Mustarde flap was deemed most appropriate.  Using a sterile surgical marker, an appropriate flap was drawn incorporating the defect. The area thus outlined was incised with a #15 scalpel blade.  The skin margins were undermined to an appropriate distance in all directions utilizing iris scissors.

## 2022-03-15 ENCOUNTER — APPOINTMENT (RX ONLY)
Dept: URBAN - METROPOLITAN AREA CLINIC 36 | Facility: CLINIC | Age: 87
Setting detail: DERMATOLOGY
End: 2022-03-15

## 2022-03-15 DIAGNOSIS — Z48.817 ENCOUNTER FOR SURGICAL AFTERCARE FOLLOWING SURGERY ON THE SKIN AND SUBCUTANEOUS TISSUE: ICD-10-CM

## 2022-03-15 PROBLEM — D04.39 CARCINOMA IN SITU OF SKIN OF OTHER PARTS OF FACE: Status: ACTIVE | Noted: 2022-03-15

## 2022-03-15 PROCEDURE — ? POST-OP WOUND CHECK

## 2022-03-15 PROCEDURE — ? CURETTAGE AND DESTRUCTION

## 2022-03-15 PROCEDURE — 17283 DSTR MAL LS F/E/E/N/L/M2.1-3: CPT

## 2022-03-15 ASSESSMENT — LOCATION DETAILED DESCRIPTION DERM: LOCATION DETAILED: LEFT SUPERIOR CENTRAL MALAR CHEEK

## 2022-03-15 ASSESSMENT — LOCATION ZONE DERM: LOCATION ZONE: FACE

## 2022-03-15 ASSESSMENT — LOCATION SIMPLE DESCRIPTION DERM: LOCATION SIMPLE: LEFT CHEEK

## 2022-03-15 NOTE — PROCEDURE: CURETTAGE AND DESTRUCTION
Detail Level: Detailed
Biopsy Photograph Reviewed: Yes
Number Of Curettages: 3
Size Of Lesion In Cm: 1.5
Size Of Lesion After Curettage: 2.1
Add Intralesional Injection: No
Concentration (Mg/Ml Or Millions Of Plaque Forming Units/Cc): 0.01
Total Volume (Ccs): 1
Anesthesia Type: 1% lidocaine with epinephrine
Cautery Type: electrodesiccation
What Was Performed First?: Curettage
Final Size Statement: The size of the lesion after curettage was
Additional Information: (Optional): The wound was cleaned, and a pressure dressing was applied.  The patient received detailed post-op instructions.
Consent was obtained from the patient. The risks, benefits and alternatives to therapy were discussed in detail and opted to change to EDC from excision given the small size of this lesion on curretting Specifically, the risks of infection, scarring, bleeding, prolonged wound healing, nerve injury, incomplete removal, allergy to anesthesia and recurrence were addressed. Alternatives to ED&C, such as: surgical removal and Mohs also discussed.  Prior to the procedure, the treatment site was clearly identified and confirmed by the patient. All components of Universal Protocol/PAUSE Rule completed.
Post-Care Instructions: I reviewed with the patient in detail post-care instructions. Patient is to keep the area dry for 48 hours, and not to engage in any swimming until the area is healed. Should the patient develop any fevers, chills, bleeding, severe pain patient will contact the office immediately.
Bill As A Line Item Or As Units: Line Item

## 2023-04-11 ENCOUNTER — APPOINTMENT (OUTPATIENT)
Dept: RADIOLOGY | Facility: MEDICAL CENTER | Age: 88
DRG: 189 | End: 2023-04-11
Attending: EMERGENCY MEDICINE
Payer: COMMERCIAL

## 2023-04-11 ENCOUNTER — HOSPITAL ENCOUNTER (INPATIENT)
Facility: MEDICAL CENTER | Age: 88
LOS: 5 days | DRG: 189 | End: 2023-04-16
Attending: EMERGENCY MEDICINE | Admitting: STUDENT IN AN ORGANIZED HEALTH CARE EDUCATION/TRAINING PROGRAM
Payer: COMMERCIAL

## 2023-04-11 DIAGNOSIS — R06.02 SHORTNESS OF BREATH: ICD-10-CM

## 2023-04-11 DIAGNOSIS — J90 PLEURAL EFFUSION: ICD-10-CM

## 2023-04-11 DIAGNOSIS — J96.01 ACUTE RESPIRATORY FAILURE WITH HYPOXIA (HCC): ICD-10-CM

## 2023-04-11 PROBLEM — R63.6 UNDERWEIGHT: Status: ACTIVE | Noted: 2023-04-11

## 2023-04-11 PROBLEM — E44.0 MODERATE PROTEIN-CALORIE MALNUTRITION (HCC): Status: ACTIVE | Noted: 2023-04-11

## 2023-04-11 PROBLEM — Z71.89 ACP (ADVANCE CARE PLANNING): Status: ACTIVE | Noted: 2023-04-11

## 2023-04-11 PROBLEM — Z86.73 H/O: CVA (CEREBROVASCULAR ACCIDENT): Status: ACTIVE | Noted: 2023-04-11

## 2023-04-11 PROBLEM — R54 AGE-RELATED PHYSICAL DEBILITY: Status: ACTIVE | Noted: 2023-04-11

## 2023-04-11 LAB
ALBUMIN SERPL BCP-MCNC: 3.1 G/DL (ref 3.2–4.9)
ALBUMIN/GLOB SERPL: 1 G/DL
ALP SERPL-CCNC: 112 U/L (ref 30–99)
ALT SERPL-CCNC: 6 U/L (ref 2–50)
ANION GAP SERPL CALC-SCNC: 6 MMOL/L (ref 7–16)
AST SERPL-CCNC: 11 U/L (ref 12–45)
BASOPHILS # BLD AUTO: 0.4 % (ref 0–1.8)
BASOPHILS # BLD: 0.03 K/UL (ref 0–0.12)
BILIRUB SERPL-MCNC: 0.3 MG/DL (ref 0.1–1.5)
BUN SERPL-MCNC: 23 MG/DL (ref 8–22)
CALCIUM ALBUM COR SERPL-MCNC: 9.6 MG/DL (ref 8.5–10.5)
CALCIUM SERPL-MCNC: 8.9 MG/DL (ref 8.5–10.5)
CHLORIDE SERPL-SCNC: 101 MMOL/L (ref 96–112)
CO2 SERPL-SCNC: 36 MMOL/L (ref 20–33)
CREAT SERPL-MCNC: 0.56 MG/DL (ref 0.5–1.4)
EKG IMPRESSION: NORMAL
EOSINOPHIL # BLD AUTO: 0.04 K/UL (ref 0–0.51)
EOSINOPHIL NFR BLD: 0.6 % (ref 0–6.9)
ERYTHROCYTE [DISTWIDTH] IN BLOOD BY AUTOMATED COUNT: 45.9 FL (ref 35.9–50)
GFR SERPLBLD CREATININE-BSD FMLA CKD-EPI: 90 ML/MIN/1.73 M 2
GLOBULIN SER CALC-MCNC: 3.1 G/DL (ref 1.9–3.5)
GLUCOSE SERPL-MCNC: 107 MG/DL (ref 65–99)
HCT VFR BLD AUTO: 42.2 % (ref 42–52)
HGB BLD-MCNC: 12.7 G/DL (ref 14–18)
IMM GRANULOCYTES # BLD AUTO: 0.02 K/UL (ref 0–0.11)
IMM GRANULOCYTES NFR BLD AUTO: 0.3 % (ref 0–0.9)
LYMPHOCYTES # BLD AUTO: 0.9 K/UL (ref 1–4.8)
LYMPHOCYTES NFR BLD: 12.9 % (ref 22–41)
MCH RBC QN AUTO: 27.9 PG (ref 27–33)
MCHC RBC AUTO-ENTMCNC: 30.1 G/DL (ref 33.7–35.3)
MCV RBC AUTO: 92.5 FL (ref 81.4–97.8)
MONOCYTES # BLD AUTO: 1.77 K/UL (ref 0–0.85)
MONOCYTES NFR BLD AUTO: 25.5 % (ref 0–13.4)
NEUTROPHILS # BLD AUTO: 4.19 K/UL (ref 1.82–7.42)
NEUTROPHILS NFR BLD: 60.3 % (ref 44–72)
NRBC # BLD AUTO: 0 K/UL
NRBC BLD-RTO: 0 /100 WBC
PLATELET # BLD AUTO: 268 K/UL (ref 164–446)
PMV BLD AUTO: 9.9 FL (ref 9–12.9)
POTASSIUM SERPL-SCNC: 4.8 MMOL/L (ref 3.6–5.5)
PROT SERPL-MCNC: 6.2 G/DL (ref 6–8.2)
RBC # BLD AUTO: 4.56 M/UL (ref 4.7–6.1)
SODIUM SERPL-SCNC: 143 MMOL/L (ref 135–145)
WBC # BLD AUTO: 7 K/UL (ref 4.8–10.8)

## 2023-04-11 PROCEDURE — 80053 COMPREHEN METABOLIC PANEL: CPT

## 2023-04-11 PROCEDURE — 700102 HCHG RX REV CODE 250 W/ 637 OVERRIDE(OP): Performed by: STUDENT IN AN ORGANIZED HEALTH CARE EDUCATION/TRAINING PROGRAM

## 2023-04-11 PROCEDURE — 71260 CT THORAX DX C+: CPT

## 2023-04-11 PROCEDURE — 71045 X-RAY EXAM CHEST 1 VIEW: CPT

## 2023-04-11 PROCEDURE — 86140 C-REACTIVE PROTEIN: CPT

## 2023-04-11 PROCEDURE — 99497 ADVNCD CARE PLAN 30 MIN: CPT | Mod: 25 | Performed by: STUDENT IN AN ORGANIZED HEALTH CARE EDUCATION/TRAINING PROGRAM

## 2023-04-11 PROCEDURE — A9270 NON-COVERED ITEM OR SERVICE: HCPCS | Performed by: STUDENT IN AN ORGANIZED HEALTH CARE EDUCATION/TRAINING PROGRAM

## 2023-04-11 PROCEDURE — 700111 HCHG RX REV CODE 636 W/ 250 OVERRIDE (IP): Performed by: STUDENT IN AN ORGANIZED HEALTH CARE EDUCATION/TRAINING PROGRAM

## 2023-04-11 PROCEDURE — 93005 ELECTROCARDIOGRAM TRACING: CPT | Performed by: EMERGENCY MEDICINE

## 2023-04-11 PROCEDURE — 99223 1ST HOSP IP/OBS HIGH 75: CPT | Mod: AI,25 | Performed by: STUDENT IN AN ORGANIZED HEALTH CARE EDUCATION/TRAINING PROGRAM

## 2023-04-11 PROCEDURE — 94640 AIRWAY INHALATION TREATMENT: CPT

## 2023-04-11 PROCEDURE — 84145 PROCALCITONIN (PCT): CPT

## 2023-04-11 PROCEDURE — 36415 COLL VENOUS BLD VENIPUNCTURE: CPT

## 2023-04-11 PROCEDURE — 85025 COMPLETE CBC W/AUTO DIFF WBC: CPT

## 2023-04-11 PROCEDURE — 99285 EMERGENCY DEPT VISIT HI MDM: CPT

## 2023-04-11 PROCEDURE — 770020 HCHG ROOM/CARE - TELE (206)

## 2023-04-11 PROCEDURE — 700101 HCHG RX REV CODE 250: Performed by: STUDENT IN AN ORGANIZED HEALTH CARE EDUCATION/TRAINING PROGRAM

## 2023-04-11 PROCEDURE — 700117 HCHG RX CONTRAST REV CODE 255: Performed by: EMERGENCY MEDICINE

## 2023-04-11 PROCEDURE — 83615 LACTATE (LD) (LDH) ENZYME: CPT

## 2023-04-11 RX ORDER — IPRATROPIUM BROMIDE AND ALBUTEROL SULFATE 2.5; .5 MG/3ML; MG/3ML
3 SOLUTION RESPIRATORY (INHALATION)
Status: DISCONTINUED | OUTPATIENT
Start: 2023-04-11 | End: 2023-04-16 | Stop reason: HOSPADM

## 2023-04-11 RX ORDER — AMLODIPINE BESYLATE 2.5 MG/1
2.5 TABLET ORAL 2 TIMES DAILY
Status: ON HOLD | COMMUNITY
End: 2023-04-16

## 2023-04-11 RX ORDER — LABETALOL HYDROCHLORIDE 5 MG/ML
10 INJECTION, SOLUTION INTRAVENOUS EVERY 4 HOURS PRN
Status: DISCONTINUED | OUTPATIENT
Start: 2023-04-11 | End: 2023-04-16 | Stop reason: HOSPADM

## 2023-04-11 RX ORDER — ONDANSETRON 4 MG/1
4 TABLET, ORALLY DISINTEGRATING ORAL EVERY 4 HOURS PRN
Status: DISCONTINUED | OUTPATIENT
Start: 2023-04-11 | End: 2023-04-16 | Stop reason: HOSPADM

## 2023-04-11 RX ORDER — ACETAMINOPHEN 325 MG/1
650 TABLET ORAL EVERY 6 HOURS PRN
Status: DISCONTINUED | OUTPATIENT
Start: 2023-04-11 | End: 2023-04-16 | Stop reason: HOSPADM

## 2023-04-11 RX ORDER — FLUTICASONE PROPIONATE 50 MCG
2 SPRAY, SUSPENSION (ML) NASAL DAILY
Status: DISCONTINUED | OUTPATIENT
Start: 2023-04-12 | End: 2023-04-16 | Stop reason: HOSPADM

## 2023-04-11 RX ORDER — FLUTICASONE PROPIONATE 50 MCG
2 SPRAY, SUSPENSION (ML) NASAL DAILY
COMMUNITY

## 2023-04-11 RX ORDER — SODIUM CHLORIDE, SODIUM LACTATE, POTASSIUM CHLORIDE, AND CALCIUM CHLORIDE .6; .31; .03; .02 G/100ML; G/100ML; G/100ML; G/100ML
500 INJECTION, SOLUTION INTRAVENOUS
Status: DISCONTINUED | OUTPATIENT
Start: 2023-04-11 | End: 2023-04-16

## 2023-04-11 RX ORDER — FUROSEMIDE 10 MG/ML
20 INJECTION INTRAMUSCULAR; INTRAVENOUS
Status: DISCONTINUED | OUTPATIENT
Start: 2023-04-11 | End: 2023-04-12

## 2023-04-11 RX ORDER — ACETAMINOPHEN 160 MG/5ML
640 SUSPENSION ORAL EVERY 6 HOURS PRN
COMMUNITY

## 2023-04-11 RX ORDER — GUAIFENESIN/DEXTROMETHORPHAN 100-10MG/5
10 SYRUP ORAL EVERY 6 HOURS PRN
Status: DISCONTINUED | OUTPATIENT
Start: 2023-04-11 | End: 2023-04-16 | Stop reason: HOSPADM

## 2023-04-11 RX ORDER — POLYETHYLENE GLYCOL 3350 17 G/17G
17 POWDER, FOR SOLUTION ORAL
COMMUNITY

## 2023-04-11 RX ORDER — POLYETHYLENE GLYCOL 3350 17 G/17G
1 POWDER, FOR SOLUTION ORAL
Status: DISCONTINUED | OUTPATIENT
Start: 2023-04-11 | End: 2023-04-16 | Stop reason: HOSPADM

## 2023-04-11 RX ORDER — BISACODYL 10 MG
10 SUPPOSITORY, RECTAL RECTAL
Status: DISCONTINUED | OUTPATIENT
Start: 2023-04-11 | End: 2023-04-16 | Stop reason: HOSPADM

## 2023-04-11 RX ORDER — ONDANSETRON 2 MG/ML
4 INJECTION INTRAMUSCULAR; INTRAVENOUS EVERY 4 HOURS PRN
Status: DISCONTINUED | OUTPATIENT
Start: 2023-04-11 | End: 2023-04-16 | Stop reason: HOSPADM

## 2023-04-11 RX ORDER — OMEPRAZOLE 20 MG/1
20 CAPSULE, DELAYED RELEASE ORAL 2 TIMES DAILY
Status: DISCONTINUED | OUTPATIENT
Start: 2023-04-11 | End: 2023-04-15

## 2023-04-11 RX ORDER — AMOXICILLIN 250 MG
2 CAPSULE ORAL 2 TIMES DAILY
Status: DISCONTINUED | OUTPATIENT
Start: 2023-04-11 | End: 2023-04-12

## 2023-04-11 RX ORDER — CEFDINIR 300 MG/1
300 CAPSULE ORAL 2 TIMES DAILY
Status: ON HOLD | COMMUNITY
Start: 2023-04-01 | End: 2023-04-16

## 2023-04-11 RX ORDER — ATORVASTATIN CALCIUM 40 MG/1
40 TABLET, FILM COATED ORAL EVERY EVENING
Status: DISCONTINUED | OUTPATIENT
Start: 2023-04-11 | End: 2023-04-16 | Stop reason: HOSPADM

## 2023-04-11 RX ORDER — FUROSEMIDE 10 MG/ML
20 INJECTION INTRAMUSCULAR; INTRAVENOUS
Status: DISCONTINUED | OUTPATIENT
Start: 2023-04-11 | End: 2023-04-11

## 2023-04-11 RX ADMIN — ATORVASTATIN CALCIUM 40 MG: 40 TABLET, FILM COATED ORAL at 22:34

## 2023-04-11 RX ADMIN — FUROSEMIDE 20 MG: 10 INJECTION, SOLUTION INTRAMUSCULAR; INTRAVENOUS at 22:34

## 2023-04-11 RX ADMIN — IOHEXOL 60 ML: 350 INJECTION, SOLUTION INTRAVENOUS at 18:58

## 2023-04-11 RX ADMIN — OMEPRAZOLE 20 MG: 20 CAPSULE, DELAYED RELEASE ORAL at 22:34

## 2023-04-11 RX ADMIN — IPRATROPIUM BROMIDE 0.5 MG: 0.5 SOLUTION RESPIRATORY (INHALATION) at 20:46

## 2023-04-11 ASSESSMENT — ENCOUNTER SYMPTOMS
DIZZINESS: 0
VOMITING: 0
WEAKNESS: 1
BRUISES/BLEEDS EASILY: 0
BLURRED VISION: 0
DEPRESSION: 0
COUGH: 1
DOUBLE VISION: 0
PALPITATIONS: 0
FOCAL WEAKNESS: 0
SPUTUM PRODUCTION: 0
SHORTNESS OF BREATH: 1
ORTHOPNEA: 1
HEADACHES: 0
FEVER: 0
ABDOMINAL PAIN: 0
MYALGIAS: 0
CHILLS: 0
HEARTBURN: 0
NAUSEA: 0

## 2023-04-11 ASSESSMENT — COPD QUESTIONNAIRES
COPD SCREENING SCORE: 4
DURING THE PAST 4 WEEKS HOW MUCH DID YOU FEEL SHORT OF BREATH: SOME OF THE TIME
HAVE YOU SMOKED AT LEAST 100 CIGARETTES IN YOUR ENTIRE LIFE: NO/DON'T KNOW
DO YOU EVER COUGH UP ANY MUCUS OR PHLEGM?: NO/ONLY WITH OCCASIONAL COLDS OR INFECTIONS

## 2023-04-11 ASSESSMENT — LIFESTYLE VARIABLES: SUBSTANCE_ABUSE: 0

## 2023-04-11 ASSESSMENT — FIBROSIS 4 INDEX: FIB4 SCORE: 1.59

## 2023-04-11 NOTE — ED TRIAGE NOTES
Chief Complaint   Patient presents with    Shortness of Breath     Pt BIB EMS from a 's assisted living facility. Pt has a recent diagnosis of pneumonia and is sent by facility for complaints of increasing SOB. Pt has been on 5L of oxygen at his facility and is not requiring an increased supplemental oxygen at this time. Pt has been taking Cefdinir since 04/02/23.     /60   Pulse 71   Temp 36.4 °C (97.5 °F) (Temporal)   Resp 20   Ht 1.829 m (6')   Wt 68 kg (150 lb)   SpO2 100%   BMI 20.34 kg/m²

## 2023-04-12 ENCOUNTER — APPOINTMENT (OUTPATIENT)
Dept: CARDIOLOGY | Facility: MEDICAL CENTER | Age: 88
DRG: 189 | End: 2023-04-12
Attending: STUDENT IN AN ORGANIZED HEALTH CARE EDUCATION/TRAINING PROGRAM
Payer: COMMERCIAL

## 2023-04-12 ENCOUNTER — APPOINTMENT (OUTPATIENT)
Dept: RADIOLOGY | Facility: MEDICAL CENTER | Age: 88
DRG: 189 | End: 2023-04-12
Attending: STUDENT IN AN ORGANIZED HEALTH CARE EDUCATION/TRAINING PROGRAM
Payer: COMMERCIAL

## 2023-04-12 LAB
ALBUMIN SERPL BCP-MCNC: 3.2 G/DL (ref 3.2–4.9)
AMYLASE FLD-CCNC: 39 U/L
ANION GAP SERPL CALC-SCNC: 10 MMOL/L (ref 7–16)
APPEARANCE FLD: NORMAL
APTT PPP: 32.2 SEC (ref 24.7–36)
BASOPHILS # BLD AUTO: 0.4 % (ref 0–1.8)
BASOPHILS # BLD: 0.02 K/UL (ref 0–0.12)
BASOPHILS NFR FLD: 1 %
BODY FLD TYPE: NORMAL
BODY FLD TYPE: NORMAL
BUN SERPL-MCNC: 23 MG/DL (ref 8–22)
BUN SERPL-MCNC: 25 MG/DL (ref 8–22)
CALCIUM ALBUM COR SERPL-MCNC: 9.6 MG/DL (ref 8.5–10.5)
CALCIUM SERPL-MCNC: 9 MG/DL (ref 8.5–10.5)
CALCIUM SERPL-MCNC: 9.2 MG/DL (ref 8.5–10.5)
CHLORIDE SERPL-SCNC: 104 MMOL/L (ref 96–112)
CHLORIDE SERPL-SCNC: 99 MMOL/L (ref 96–112)
CO2 SERPL-SCNC: 34 MMOL/L (ref 20–33)
CO2 SERPL-SCNC: 35 MMOL/L (ref 20–33)
COLOR FLD: YELLOW
CORTIS SERPL-MCNC: 24.9 UG/DL (ref 0–23)
CREAT SERPL-MCNC: 0.54 MG/DL (ref 0.5–1.4)
CREAT SERPL-MCNC: 0.64 MG/DL (ref 0.5–1.4)
CRP SERPL HS-MCNC: 2.04 MG/DL (ref 0–0.75)
CRP SERPL HS-MCNC: 2.25 MG/DL (ref 0–0.75)
CYTOLOGY REG CYTOL: NORMAL
EKG IMPRESSION: NORMAL
EOSINOPHIL # BLD AUTO: 0.02 K/UL (ref 0–0.51)
EOSINOPHIL NFR BLD: 0.4 % (ref 0–6.9)
ERYTHROCYTE [DISTWIDTH] IN BLOOD BY AUTOMATED COUNT: 44.6 FL (ref 35.9–50)
FUNGUS SPEC FUNGUS STN: NORMAL
GFR SERPLBLD CREATININE-BSD FMLA CKD-EPI: 87 ML/MIN/1.73 M 2
GFR SERPLBLD CREATININE-BSD FMLA CKD-EPI: 91 ML/MIN/1.73 M 2
GLUCOSE FLD-MCNC: 9 MG/DL
GLUCOSE SERPL-MCNC: 95 MG/DL (ref 65–99)
GLUCOSE SERPL-MCNC: 97 MG/DL (ref 65–99)
GRAM STN SPEC: NORMAL
HCT VFR BLD AUTO: 40.3 % (ref 42–52)
HGB BLD-MCNC: 12.3 G/DL (ref 14–18)
IMM GRANULOCYTES # BLD AUTO: 0.03 K/UL (ref 0–0.11)
IMM GRANULOCYTES NFR BLD AUTO: 0.6 % (ref 0–0.9)
INR PPP: 1.11 (ref 0.87–1.13)
LDH FLD L TO P-CCNC: 496 U/L
LDH SERPL L TO P-CCNC: 162 U/L (ref 107–266)
LYMPHOCYTES # BLD AUTO: 0.76 K/UL (ref 1–4.8)
LYMPHOCYTES NFR BLD: 14.2 % (ref 22–41)
LYMPHOCYTES NFR FLD: 82 %
MAGNESIUM SERPL-MCNC: 1.9 MG/DL (ref 1.5–2.5)
MCH RBC QN AUTO: 27.8 PG (ref 27–33)
MCHC RBC AUTO-ENTMCNC: 30.5 G/DL (ref 33.7–35.3)
MCV RBC AUTO: 91 FL (ref 81.4–97.8)
MONOCYTES # BLD AUTO: 1.21 K/UL (ref 0–0.85)
MONOCYTES NFR BLD AUTO: 22.6 % (ref 0–13.4)
MONONUC CELLS NFR FLD: 14 %
NEUTROPHILS # BLD AUTO: 3.31 K/UL (ref 1.82–7.42)
NEUTROPHILS NFR BLD: 61.8 % (ref 44–72)
NEUTROPHILS NFR FLD: 3 %
NRBC # BLD AUTO: 0 K/UL
NRBC BLD-RTO: 0 /100 WBC
NT-PROBNP SERPL IA-MCNC: 2765 PG/ML (ref 0–125)
PHOSPHATE SERPL-MCNC: 2.3 MG/DL (ref 2.5–4.5)
PLATELET # BLD AUTO: 234 K/UL (ref 164–446)
PMV BLD AUTO: 10.1 FL (ref 9–12.9)
POTASSIUM SERPL-SCNC: 4 MMOL/L (ref 3.6–5.5)
POTASSIUM SERPL-SCNC: 4.5 MMOL/L (ref 3.6–5.5)
PROCALCITONIN SERPL-MCNC: 0.15 NG/ML
PROCALCITONIN SERPL-MCNC: 0.15 NG/ML
PROT FLD-MCNC: 3.6 G/DL
PROTHROMBIN TIME: 14.2 SEC (ref 12–14.6)
RBC # BLD AUTO: 4.43 M/UL (ref 4.7–6.1)
RBC # FLD: 6000 CELLS/UL
SIGNIFICANT IND 70042: NORMAL
SIGNIFICANT IND 70042: NORMAL
SITE SITE: NORMAL
SITE SITE: NORMAL
SODIUM SERPL-SCNC: 144 MMOL/L (ref 135–145)
SODIUM SERPL-SCNC: 149 MMOL/L (ref 135–145)
SOURCE SOURCE: NORMAL
SOURCE SOURCE: NORMAL
WBC # BLD AUTO: 5.4 K/UL (ref 4.8–10.8)
WBC # FLD: 566 CELLS/UL

## 2023-04-12 PROCEDURE — 36415 COLL VENOUS BLD VENIPUNCTURE: CPT

## 2023-04-12 PROCEDURE — 80069 RENAL FUNCTION PANEL: CPT

## 2023-04-12 PROCEDURE — 82533 TOTAL CORTISOL: CPT

## 2023-04-12 PROCEDURE — 71045 X-RAY EXAM CHEST 1 VIEW: CPT

## 2023-04-12 PROCEDURE — 700102 HCHG RX REV CODE 250 W/ 637 OVERRIDE(OP): Performed by: STUDENT IN AN ORGANIZED HEALTH CARE EDUCATION/TRAINING PROGRAM

## 2023-04-12 PROCEDURE — 83880 ASSAY OF NATRIURETIC PEPTIDE: CPT

## 2023-04-12 PROCEDURE — 87102 FUNGUS ISOLATION CULTURE: CPT

## 2023-04-12 PROCEDURE — 87205 SMEAR GRAM STAIN: CPT

## 2023-04-12 PROCEDURE — 4410569 US-THORACENTESIS PUNCTURE

## 2023-04-12 PROCEDURE — 76775 US EXAM ABDO BACK WALL LIM: CPT

## 2023-04-12 PROCEDURE — 700117 HCHG RX CONTRAST REV CODE 255: Performed by: STUDENT IN AN ORGANIZED HEALTH CARE EDUCATION/TRAINING PROGRAM

## 2023-04-12 PROCEDURE — 700101 HCHG RX REV CODE 250: Performed by: STUDENT IN AN ORGANIZED HEALTH CARE EDUCATION/TRAINING PROGRAM

## 2023-04-12 PROCEDURE — 89051 BODY FLUID CELL COUNT: CPT

## 2023-04-12 PROCEDURE — 84157 ASSAY OF PROTEIN OTHER: CPT

## 2023-04-12 PROCEDURE — 83615 LACTATE (LD) (LDH) ENZYME: CPT

## 2023-04-12 PROCEDURE — 88305 TISSUE EXAM BY PATHOLOGIST: CPT

## 2023-04-12 PROCEDURE — 87116 MYCOBACTERIA CULTURE: CPT

## 2023-04-12 PROCEDURE — 93308 TTE F-UP OR LMTD: CPT | Mod: 26 | Performed by: INTERNAL MEDICINE

## 2023-04-12 PROCEDURE — 93308 TTE F-UP OR LMTD: CPT

## 2023-04-12 PROCEDURE — 87206 SMEAR FLUORESCENT/ACID STAI: CPT

## 2023-04-12 PROCEDURE — 700111 HCHG RX REV CODE 636 W/ 250 OVERRIDE (IP): Performed by: STUDENT IN AN ORGANIZED HEALTH CARE EDUCATION/TRAINING PROGRAM

## 2023-04-12 PROCEDURE — 93010 ELECTROCARDIOGRAM REPORT: CPT | Performed by: INTERNAL MEDICINE

## 2023-04-12 PROCEDURE — 80048 BASIC METABOLIC PNL TOTAL CA: CPT

## 2023-04-12 PROCEDURE — 99232 SBSQ HOSP IP/OBS MODERATE 35: CPT | Mod: GC | Performed by: HOSPITALIST

## 2023-04-12 PROCEDURE — 87015 SPECIMEN INFECT AGNT CONCNTJ: CPT

## 2023-04-12 PROCEDURE — 85025 COMPLETE CBC W/AUTO DIFF WBC: CPT

## 2023-04-12 PROCEDURE — 93005 ELECTROCARDIOGRAM TRACING: CPT | Performed by: STUDENT IN AN ORGANIZED HEALTH CARE EDUCATION/TRAINING PROGRAM

## 2023-04-12 PROCEDURE — 94669 MECHANICAL CHEST WALL OSCILL: CPT

## 2023-04-12 PROCEDURE — 700101 HCHG RX REV CODE 250

## 2023-04-12 PROCEDURE — 85730 THROMBOPLASTIN TIME PARTIAL: CPT

## 2023-04-12 PROCEDURE — 87070 CULTURE OTHR SPECIMN AEROBIC: CPT

## 2023-04-12 PROCEDURE — 82570 ASSAY OF URINE CREATININE: CPT

## 2023-04-12 PROCEDURE — 82150 ASSAY OF AMYLASE: CPT

## 2023-04-12 PROCEDURE — 0W993ZZ DRAINAGE OF RIGHT PLEURAL CAVITY, PERCUTANEOUS APPROACH: ICD-10-PCS | Performed by: RADIOLOGY

## 2023-04-12 PROCEDURE — 84145 PROCALCITONIN (PCT): CPT

## 2023-04-12 PROCEDURE — 88112 CYTOPATH CELL ENHANCE TECH: CPT

## 2023-04-12 PROCEDURE — 83735 ASSAY OF MAGNESIUM: CPT

## 2023-04-12 PROCEDURE — A9270 NON-COVERED ITEM OR SERVICE: HCPCS | Performed by: STUDENT IN AN ORGANIZED HEALTH CARE EDUCATION/TRAINING PROGRAM

## 2023-04-12 PROCEDURE — 94760 N-INVAS EAR/PLS OXIMETRY 1: CPT

## 2023-04-12 PROCEDURE — 86140 C-REACTIVE PROTEIN: CPT

## 2023-04-12 PROCEDURE — 770020 HCHG ROOM/CARE - TELE (206)

## 2023-04-12 PROCEDURE — 85610 PROTHROMBIN TIME: CPT

## 2023-04-12 PROCEDURE — 82945 GLUCOSE OTHER FLUID: CPT

## 2023-04-12 PROCEDURE — 97602 WOUND(S) CARE NON-SELECTIVE: CPT

## 2023-04-12 RX ORDER — BUPIVACAINE HYDROCHLORIDE 5 MG/ML
INJECTION, SOLUTION EPIDURAL; INTRACAUDAL
Status: DISPENSED
Start: 2023-04-12 | End: 2023-04-13

## 2023-04-12 RX ORDER — BUPIVACAINE HYDROCHLORIDE 5 MG/ML
10 INJECTION, SOLUTION EPIDURAL; INTRACAUDAL ONCE
Status: COMPLETED | OUTPATIENT
Start: 2023-04-12 | End: 2023-04-12

## 2023-04-12 RX ADMIN — ATORVASTATIN CALCIUM 40 MG: 40 TABLET, FILM COATED ORAL at 16:25

## 2023-04-12 RX ADMIN — HUMAN ALBUMIN MICROSPHERES AND PERFLUTREN 3 ML: 10; .22 INJECTION, SOLUTION INTRAVENOUS at 22:45

## 2023-04-12 RX ADMIN — OMEPRAZOLE 20 MG: 20 CAPSULE, DELAYED RELEASE ORAL at 16:25

## 2023-04-12 RX ADMIN — OMEPRAZOLE 20 MG: 20 CAPSULE, DELAYED RELEASE ORAL at 04:31

## 2023-04-12 RX ADMIN — FUROSEMIDE 20 MG: 10 INJECTION, SOLUTION INTRAMUSCULAR; INTRAVENOUS at 04:30

## 2023-04-12 RX ADMIN — FLUTICASONE PROPIONATE 100 MCG: 50 SPRAY, METERED NASAL at 04:31

## 2023-04-12 RX ADMIN — BUPIVACAINE HYDROCHLORIDE 5 ML: 5 INJECTION, SOLUTION EPIDURAL; INTRACAUDAL; PERINEURAL at 15:57

## 2023-04-12 RX ADMIN — ASPIRIN 81 MG: 81 TABLET, COATED ORAL at 22:13

## 2023-04-12 ASSESSMENT — COGNITIVE AND FUNCTIONAL STATUS - GENERAL
PERSONAL GROOMING: A LITTLE
MOVING TO AND FROM BED TO CHAIR: A LOT
CLIMB 3 TO 5 STEPS WITH RAILING: TOTAL
TOILETING: A LOT
TURNING FROM BACK TO SIDE WHILE IN FLAT BAD: A LITTLE
DRESSING REGULAR UPPER BODY CLOTHING: A LITTLE
EATING MEALS: A LITTLE
DAILY ACTIVITIY SCORE: 15
SUGGESTED CMS G CODE MODIFIER MOBILITY: CL
SUGGESTED CMS G CODE MODIFIER DAILY ACTIVITY: CK
MOBILITY SCORE: 11
DRESSING REGULAR LOWER BODY CLOTHING: A LOT
WALKING IN HOSPITAL ROOM: TOTAL
MOVING FROM LYING ON BACK TO SITTING ON SIDE OF FLAT BED: A LOT
HELP NEEDED FOR BATHING: A LOT
STANDING UP FROM CHAIR USING ARMS: A LOT

## 2023-04-12 ASSESSMENT — ENCOUNTER SYMPTOMS
SHORTNESS OF BREATH: 1
VOMITING: 0
DEPRESSION: 0
HEADACHES: 0
DIARRHEA: 0
SORE THROAT: 0
LOSS OF CONSCIOUSNESS: 0
CONSTIPATION: 0
INSOMNIA: 0
BLURRED VISION: 0
SPUTUM PRODUCTION: 0
HEARTBURN: 0
FEVER: 0
ABDOMINAL PAIN: 0
DIZZINESS: 0
NERVOUS/ANXIOUS: 0
WEIGHT LOSS: 0
WEAKNESS: 0
PALPITATIONS: 0
NAUSEA: 0
BACK PAIN: 0
EYE REDNESS: 0
COUGH: 0
MYALGIAS: 0
CHILLS: 0

## 2023-04-12 ASSESSMENT — LIFESTYLE VARIABLES: SUBSTANCE_ABUSE: 0

## 2023-04-12 ASSESSMENT — PAIN DESCRIPTION - PAIN TYPE: TYPE: ACUTE PAIN;CHRONIC PAIN

## 2023-04-12 NOTE — PROGRESS NOTES
Monitor summary : SR 64-77, DC .18, QRS .08, QT .45 with frequent ecopy(pvc's/couplets/bigem) per strip from monitor room.

## 2023-04-12 NOTE — ED PROVIDER NOTES
ER Provider Note    Scribed for Dennis Stevenson D.O. by Shorty Thomas. 4/11/2023  5:01 PM    Primary Care Provider: Pcp Pt States None    CHIEF COMPLAINT  Chief Complaint   Patient presents with    Shortness of Breath     Pt BIB EMS from a [facility] assisted living facility. Pt has a recent diagnosis of pneumonia and is sent by facility for complaints of increasing SOB. Pt has been on 5L of oxygen at his facility and is not requiring an increased supplemental oxygen at this time. Pt has been taking Cefdinir since 04/02/23.       HPI/ROS  LIMITATION TO HISTORY   Patient with hard of hearing    OUTSIDE HISTORIAN(S):  Paramedics    Dave Santana is a 89 y.o. male who is hard of hearing who presents to the Emergency Department via EMS for evaluation of acute shortness of breath. EMS is bringing patient from a [facility] assisted living facility. The nurse states he is at baseline orientation. The patient was recently diagnosed with pneumonia and was sent here by the caregiver facility's faculty for complaints of worsening shortness of breath. No alleviating or exacerbating factors reported. He has been on 5 L of oxygen at the facility but does not need any increased supplemental oxygen at this time. He is on Cefdinir as of 4/23/23. Drug allergies to Lidocaine and Procaine.     ROS as per HPI.    PAST MEDICAL HISTORY  Past Medical History:   Diagnosis Date    Dyslipidemia     Hypertension        SURGICAL HISTORY  Past Surgical History:   Procedure Laterality Date    OTHER CARDIAC SURGERY      OTHER NEUROLOGICAL SURG         FAMILY HISTORY  Family History   Problem Relation Age of Onset    Heart Attack Mother     Stroke Father        SOCIAL HISTORY   reports that he has never smoked. His smokeless tobacco use includes chew. He reports that he does not drink alcohol and does not use drugs.    CURRENT MEDICATIONS  Previous Medications    ACETAMINOPHEN (TYLENOL) 160 MG/5ML SUSPENSION    Take 640 mg by mouth every 6  hours as needed. 20 ml (640 mg)  Indications: Pain    AMLODIPINE (NORVASC) 2.5 MG TAB    Take 2.5 mg by mouth 2 times a day.    CEFDINIR (OMNICEF) 300 MG CAP    Take 300 mg by mouth 2 times a day. Pt completed a 10 day course of CEFDINIR on 4/11    DICLOFENAC SODIUM (VOLTAREN) 1 % GEL    Apply 2-4 g topically 4 times a day as needed. ANKLES, KNEES, LEFT SCAPULA    FLUTICASONE (FLONASE) 50 MCG/ACT NASAL SPRAY    Administer 2 Sprays into affected nostril(S) every day.    POLYETHYLENE GLYCOL/LYTES (MIRALAX) 17 G PACK    Take 17 g by mouth every day.       ALLERGIES  Lidocaine and Procaine    PHYSICAL EXAM  /60   Pulse 71   Temp 36.4 °C (97.5 °F) (Temporal)   Resp 20   Ht 1.829 m (6')   Wt 68 kg (150 lb)   SpO2 100%   BMI 20.34 kg/m²     General: No acute distress. Hard of hearing.  HENT: Normocephalic, Mucus membranes are moist.   Chest: Lungs have even and unlabored respirations, Clear to auscultation.   Cardiovascular: Regular rate and regular rhythm, No peripheral cyanosis.  Abdomen: Non distended.  Extremities: Moves extremities spontaneously  Neuro: Awake, Able to relay recent events. Non-Conversive. Hard of hearing. Not answering questions appropriately. Moves all extremities equally.     EXTERNAL RECORDS REVIEWED  Patient has not been seen since 2018. There are no records to be reviewed at this time.    INITIAL ASSESSMENT  Patient is complaining of shortness of breath from nursing staff at his care center. He is mildly tachypneic with no respiratory distress. He is on 5 L/min of oxygen. His is at 97% saturation and will be evaluated with labs and a chest x-ray.     ED Observation Status? Yes; I am placing the patient in to an observation status due to a diagnostic uncertainty as well as therapeutic intensity. Patient placed in observation status at 5:06 PM, 4/11/2023.     Observation plan is as follows: I will observe his respiratory status pending evaluation for complaints of shortness of  breath    Upon Reevaluation, the patient's condition has: not improved; and will be escalated to hospitalization.    Patient discharged from ED Observation status at 7:30 PM (Time) 4/11/2023  (Date).     DIAGNOSTIC STUDIES    Labs:   Results for orders placed or performed during the hospital encounter of 04/11/23   CBC with Differential   Result Value Ref Range    WBC 7.0 4.8 - 10.8 K/uL    RBC 4.56 (L) 4.70 - 6.10 M/uL    Hemoglobin 12.7 (L) 14.0 - 18.0 g/dL    Hematocrit 42.2 42.0 - 52.0 %    MCV 92.5 81.4 - 97.8 fL    MCH 27.9 27.0 - 33.0 pg    MCHC 30.1 (L) 33.7 - 35.3 g/dL    RDW 45.9 35.9 - 50.0 fL    Platelet Count 268 164 - 446 K/uL    MPV 9.9 9.0 - 12.9 fL    Neutrophils-Polys 60.30 44.00 - 72.00 %    Lymphocytes 12.90 (L) 22.00 - 41.00 %    Monocytes 25.50 (H) 0.00 - 13.40 %    Eosinophils 0.60 0.00 - 6.90 %    Basophils 0.40 0.00 - 1.80 %    Immature Granulocytes 0.30 0.00 - 0.90 %    Nucleated RBC 0.00 /100 WBC    Neutrophils (Absolute) 4.19 1.82 - 7.42 K/uL    Lymphs (Absolute) 0.90 (L) 1.00 - 4.80 K/uL    Monos (Absolute) 1.77 (H) 0.00 - 0.85 K/uL    Eos (Absolute) 0.04 0.00 - 0.51 K/uL    Baso (Absolute) 0.03 0.00 - 0.12 K/uL    Immature Granulocytes (abs) 0.02 0.00 - 0.11 K/uL    NRBC (Absolute) 0.00 K/uL   Comp Metabolic Panel   Result Value Ref Range    Sodium 143 135 - 145 mmol/L    Potassium 4.8 3.6 - 5.5 mmol/L    Chloride 101 96 - 112 mmol/L    Co2 36 (H) 20 - 33 mmol/L    Anion Gap 6.0 (L) 7.0 - 16.0    Glucose 107 (H) 65 - 99 mg/dL    Bun 23 (H) 8 - 22 mg/dL    Creatinine 0.56 0.50 - 1.40 mg/dL    Calcium 8.9 8.5 - 10.5 mg/dL    AST(SGOT) 11 (L) 12 - 45 U/L    ALT(SGPT) 6 2 - 50 U/L    Alkaline Phosphatase 112 (H) 30 - 99 U/L    Total Bilirubin 0.3 0.1 - 1.5 mg/dL    Albumin 3.1 (L) 3.2 - 4.9 g/dL    Total Protein 6.2 6.0 - 8.2 g/dL    Globulin 3.1 1.9 - 3.5 g/dL    A-G Ratio 1.0 g/dL   ESTIMATED GFR   Result Value Ref Range    GFR (CKD-EPI) 90 >60 mL/min/1.73 m 2   CORRECTED CALCIUM   Result  Value Ref Range    Correct Calcium 9.6 8.5 - 10.5 mg/dL   EKG   Result Value Ref Range    Report       [facility] Emergency Dept.    Test Date:  2023  Pt Name:    KATIA GARCIA              Department: ER  MRN:        [id number]                      Room:       RD 12  Gender:     Male                         Technician: [id number]  :        [id number]                   Requested By:ER TRIAGE PROTOCOL  Order #:    [id number]                    Reading MD: GEOVANNI ESPINOSA D.O.    Measurements  Intervals                                Axis  Rate:       64                           P:          71  AR:         148                          QRS:        101  QRSD:       89                           T:          -21  QT:         391  QTc:        404    Interpretive Statements  Sinus rhythm  Right axis deviation  Borderline T abnormalities, diffuse leads  Baseline wander in lead(s) V5,V6  Compared to ECG 2018 22:44:08  Right-axis deviation now present  Sinus bradycardia no longer present  T-wave abnormality still present  Electronically Signed On 2023 19:28:28 P DT by GEOVANNI ESPINOSA D.O.           EKG:   I have independently interpreted the above EKG.    Radiology:   The attending emergency physician has independently interpreted the diagnostic imaging associated with this visit and am waiting the final reading from the radiologist.   Preliminary interpretation is as follows: Right-sided effusion  Radiologist interpretation:   CT-CHEST (THORAX) WITH   Final Result      1.  Large right pleural effusion occupies the vast majority of the right hemithorax.      2.  Collapse of the majority of the right lung with aeration present only in the anterior right upper pulmonary lobe.      3.  Moderate size left pleural effusion.      4.  Incidentally noted in partially visualized right hydronephrosis.      Fleischner Society pulmonary nodule recommendations:   Not Applicable          DX-CHEST-PORTABLE (1 VIEW)   Final Result         Large right pleural effusion.          COURSE & MEDICAL DECISION MAKING     COURSE AND PLAN  5:01 PM - Patient seen and examined at bedside. Discussed plan of care, including labs and imaging. Ordered for EKG, Corrected Calcium, Estimated GFR, CMP, CBC w. Diff, DX-Chest-Portable, and CT-Chest (thorax) to evaluate his symptoms.     7:15 PM - I spoke with family regarding the patient's plan of care, and the plan for admission. Family understand and agree to plan of care. Family also informed me that he does not have dementia.     7:23 PM - Patient was reevaluated at bedside. I updated them on my call with his family. Discussed lab and radiology results with the patient and informed them that they have fluid in their lung, and the plan to drain it. I also informed him of the possibility of admission. Patient understands and verbalizes agreement to plan of care.     ED Summary: Patient presents with complaints of shortness of breath been being treated for pneumonia, and he is complaining of shortness of breath he is not hypoxic he is tachypneic, no significant respiratory distress.    His x-ray shows a daija out right lung, CT confirms large pulmonary effusion, this is a probable reason for his shortness of breath.  I spoke with the patient's emergency contact listed on the nursing home sheet and the emergency contact believes this is something the patient would want to have done for comfort.    The patient is very hard of hearing, I spoke with him and explained to him what is going on and he is agreeable to have admission for thoracentesis.  Spoke with the hospitalist the patient will be admitted for further evaluation and treatmen    Decision tools and prescription drugs considered including, but not limited to:     ADDITIONAL PROBLEM LIST      DISPOSITION AND DISCUSSIONS  I have discussed management of the patient with the following physicians and DOMINGO's:   Hospitalist    Discussion of management with other [facility] or appropriate source(s): None    Barriers to care at this time, including but not limited to: Patient does not have a PCP     DISPOSITION:  Patient will be hospitalized by Hospitalist in guarded condition.      FINAL DIAGNOSIS  1. Shortness of breath    2. Pleural effusion         Shorty RIZZO (Scribe), am scribing for, and in the presence of, Dennis Stevenson D.O..    Electronically signed by: Shorty Thomas (Scribe), 4/11/2023    Dennis RIZZO D.O. personally performed the services described in this documentation, as scribed by Shorty Thomsa in my presence, and it is both accurate and complete.    The note accurately reflects work and decisions made by me.  Dennis Stevenson D.O.  4/11/2023  8:17 PM

## 2023-04-12 NOTE — ASSESSMENT & PLAN NOTE
Lipid panel 2018: Chol 146, Triglycerides 104, HDL 34, LDL 91  ASCVD-10 score:   Statin: Atorvastatin 40 mg (high intensity)  Diet and lifestyle changes discussed with patient.

## 2023-04-12 NOTE — PROGRESS NOTES
Patient 89 yo [employer]  ,  with a history of HTN, CVA stroke (2018), admitted for progressive shortness of breath found to have a large R pleural effusion and a small-moderate L pleural effusion. He has no documented history of heart failure, cirrhosis, malignancy, recent infections such as pneumonia.     Patient was evaluated at bedside. He was awake and alert.  Communication was better with hearing aids given at bedside. He follows commands appropriately. He is clearly in respiratory distress when talking. Vitals are stable, hypertensive and with 99% on 4L nasal canula. NC was not removed to assess room air due to work of breathing. Overall he is cachectic appearing with clear temporal wasting and apparent weight loss. He denies any pain including chest, abdomen. Skin exam intact without obvious trauma, but does have surgical scar to bilateral lower legs.     He was informed of plan for thoracentesis. Will eval for transudative vs. Exudative and go from there. Family was called by Dr. Vang and updated.     Contreras Stark MD

## 2023-04-12 NOTE — CARE PLAN
Problem: Risk for Infection - COPD  Goal: Patient will remain free from signs and symptoms of infection  Outcome: Progressing ROBERT effusions, IS beiing encouraged.      Problem: Ineffective Airway Clearance  Goal: Patient will maintain patent airway with clear/clearing breath sounds  Outcome: Progressing Cough and deep breath encouraged. O2 as needed    The patient is Stable - Low risk of patient condition declining or worsening    Shift Goals  Clinical Goals: Resp stable  Patient Goals: Sleep  Family Goals: USHA    Progress made toward(s) clinical / shift goals:  Assumed care of patient at 0715. Received bedside report from night shift RN. Bed is locked and lowest position, call light within reach. Treaded socks in place. Patient updated on plan of care, no complaints or pain at this time. White board updated. Pt AxOx4 Patient breathing pattern is unlabored on 3L NC. Pt is tele/NSR. NPO for possible IR procedure today.  All needs met at this time. Will continue care and monitoring as ordered.

## 2023-04-12 NOTE — CARE PLAN
The patient is Watcher - Medium risk of patient condition declining or worsening    Shift Goals  Clinical Goals: Respiratory Stability  Patient Goals: Sleep  Family Goals: USHA    Progress made toward(s) clinical / shift goals:    Problem: Ineffective Airway Clearance  Goal: Patient will maintain patent airway with clear/clearing breath sounds  Outcome: Progressing     Problem: Hemodynamics  Goal: Patient's hemodynamics, fluid balance and neurologic status will be stable or improve  Outcome: Progressing     Problem: Respiratory  Goal: Patient will achieve/maintain optimum respiratory ventilation and gas exchange  Outcome: Progressing       Patient is not progressing towards the following goals:

## 2023-04-12 NOTE — H&P
Hospital Medicine History & Physical Note    Date of Service  4/11/2023    Primary Care Physician  Pcp Pt States None    Consultants  IR    Code Status  DNAR/DNI    Chief Complaint  Chief Complaint   Patient presents with    Shortness of Breath     Pt BIB EMS from a 's assisted living facility. Pt has a recent diagnosis of pneumonia and is sent by facility for complaints of increasing SOB. Pt has been on 5L of oxygen at his facility and is not requiring an increased supplemental oxygen at this time. Pt has been taking Cefdinir since 04/02/23.       History of Presenting Illness  Dave Santana is a 89 y.o. frail, cachectic male with h/o hypertension, CVA who presented 4/11/2023 with evaluation for shortness of breath.  History limited as patient is severely presbycusis and poor historian.  He does endorse progressive shortness of breath for the past several days.  Noted to have white out of lung on right side by CXR.  CT chest noted to have large right pleural effusion, moderate left pleural effusion.  He is requiring 3 L nasal cannula support.  Admitted to medicine service for further evaluation and treatment.    I discussed the plan of care with patient and bedside RN.    Review of Systems  Review of Systems   Constitutional:  Positive for malaise/fatigue. Negative for chills and fever.   HENT:  Negative for hearing loss and tinnitus.    Eyes:  Negative for blurred vision and double vision.   Respiratory:  Positive for cough and shortness of breath. Negative for sputum production.    Cardiovascular:  Positive for orthopnea. Negative for chest pain, palpitations and leg swelling.   Gastrointestinal:  Negative for abdominal pain, heartburn, nausea and vomiting.   Genitourinary:  Negative for dysuria and hematuria.   Musculoskeletal:  Negative for joint pain and myalgias.   Skin:  Negative for itching and rash.   Neurological:  Positive for weakness. Negative for dizziness, focal weakness and headaches.    Endo/Heme/Allergies:  Negative for environmental allergies. Does not bruise/bleed easily.   Psychiatric/Behavioral:  Negative for depression and substance abuse.    All other systems reviewed and are negative.    Past Medical History   has a past medical history of Dyslipidemia and Hypertension.    Surgical History   has a past surgical history that includes other cardiac surgery and other neurological surg.     Family History  family history includes Heart Attack in his mother; Stroke in his father.   Family history reviewed with patient. There is family history that is pertinent to the chief complaint.     Social History   reports that he has never smoked. His smokeless tobacco use includes chew. He reports that he does not drink alcohol and does not use drugs.    Allergies  Allergies   Allergen Reactions    Lidocaine     Procaine        Medications  Prior to Admission Medications   Prescriptions Last Dose Informant Patient Reported? Taking?   acetaminophen (TYLENOL) 160 MG/5ML Suspension 4/9/2023 at PRN MAR from Other Facility Yes Yes   Sig: Take 640 mg by mouth every 6 hours as needed. 20 ml (640 mg)  Indications: Pain   amLODIPine (NORVASC) 2.5 MG Tab 4/11/2023 at AM MAR from Other Facility Yes Yes   Sig: Take 2.5 mg by mouth 2 times a day.   cefdinir (OMNICEF) 300 MG Cap 4/11/2023 at AM MAR from Other Facility Yes Yes   Sig: Take 300 mg by mouth 2 times a day. Pt completed a 10 day course of CEFDINIR on 4/11   diclofenac sodium (VOLTAREN) 1 % Gel 4/11/2023 at AM MAR from Other Facility Yes Yes   Sig: Apply 2-4 g topically 4 times a day as needed. ANKLES, KNEES, LEFT SCAPULA   fluticasone (FLONASE) 50 MCG/ACT nasal spray 4/11/2023 at AM MAR from Other Facility Yes Yes   Sig: Administer 2 Sprays into affected nostril(S) every day.   polyethylene glycol/lytes (MIRALAX) 17 g Pack 4/11/2023 at AM MAR from Other Facility Yes Yes   Sig: Take 17 g by mouth every day.      Facility-Administered Medications: None        Physical Exam  Temp:  [36.1 °C (96.9 °F)-36.6 °C (97.8 °F)] 36.3 °C (97.4 °F)  Pulse:  [65-88] 80  Resp:  [16-36] 18  BP: (110-172)/(48-91) 130/60  SpO2:  [89 %-100 %] 90 %  Blood Pressure : (!) 172/68   Temperature: 36.4 °C (97.5 °F)   Pulse: 73   Respiration: 20   Pulse Oximetry: 95 %       Physical Exam  Vitals and nursing note reviewed.   Constitutional:       General: He is not in acute distress.     Appearance: He is ill-appearing.   HENT:      Head: Normocephalic and atraumatic.      Ears:      Comments: Presbycusis     Nose: Nose normal.      Mouth/Throat:      Mouth: Mucous membranes are moist.      Pharynx: Oropharynx is clear.   Eyes:      General: No scleral icterus.     Extraocular Movements: Extraocular movements intact.   Cardiovascular:      Rate and Rhythm: Normal rate and regular rhythm.      Pulses: Normal pulses.      Heart sounds:     No friction rub.   Pulmonary:      Effort: No respiratory distress.      Breath sounds: No stridor. Wheezing and rales present.   Chest:      Chest wall: No tenderness.   Abdominal:      General: There is no distension.      Tenderness: There is no abdominal tenderness. There is no guarding or rebound.   Musculoskeletal:         General: No swelling or tenderness. Normal range of motion.      Cervical back: Neck supple. No tenderness.   Skin:     General: Skin is warm and dry.      Capillary Refill: Capillary refill takes less than 2 seconds.   Neurological:      General: No focal deficit present.      Mental Status: He is alert and oriented to person, place, and time.   Psychiatric:         Mood and Affect: Mood normal.       Laboratory:  Recent Labs     04/11/23 1650 04/12/23  0035   WBC 7.0 5.4   RBC 4.56* 4.43*   HEMOGLOBIN 12.7* 12.3*   HEMATOCRIT 42.2 40.3*   MCV 92.5 91.0   MCH 27.9 27.8   MCHC 30.1* 30.5*   RDW 45.9 44.6   PLATELETCT 268 234   MPV 9.9 10.1     Recent Labs     04/11/23  1650 04/12/23  0035 04/12/23  0957   SODIUM 143 149* 144    POTASSIUM 4.8 4.5 4.0   CHLORIDE 101 104 99   CO2 36* 34* 35*   GLUCOSE 107* 95 97   BUN 23* 25* 23*   CREATININE 0.56 0.54 0.64   CALCIUM 8.9 9.0 9.2     Recent Labs     04/11/23  1650 04/12/23  0035 04/12/23  0957   ALTSGPT 6  --   --    ASTSGOT 11*  --   --    ALKPHOSPHAT 112*  --   --    TBILIRUBIN 0.3  --   --    GLUCOSE 107* 95 97     Recent Labs     04/12/23  0957   APTT 32.2   INR 1.11     Recent Labs     04/12/23  0035   NTPROBNP 2765*         No results for input(s): TROPONINT in the last 72 hours.    Imaging:  US-RENAL   Final Result      1.  Moderate right hydroureteronephrosis.      DX-CHEST-PORTABLE (1 VIEW)   Final Result      1.  Interval decrease in size in right pleural effusion status post thoracentesis without evidence of pneumothorax.      CT-CHEST (THORAX) WITH   Final Result      1.  Large right pleural effusion occupies the vast majority of the right hemithorax.      2.  Collapse of the majority of the right lung with aeration present only in the anterior right upper pulmonary lobe.      3.  Moderate size left pleural effusion.      4.  Incidentally noted in partially visualized right hydronephrosis.      Fleischner Society pulmonary nodule recommendations:   Not Applicable         DX-CHEST-PORTABLE (1 VIEW)   Final Result         Large right pleural effusion.      US-THORACENTESIS PUNCTURE RIGHT    (Results Pending)   EC-ECHOCARDIOGRAM COMPLETE W/O CONT    (Results Pending)   US-THORACENTESIS PUNCTURE LEFT    (Results Pending)               X-Ray:  I have personally reviewed the images and compared with prior images.  EKG:  I have personally reviewed the images and compared with prior images.    Assessment/Plan:  Justification for Admission Status  I anticipate this patient will require at least 2 midnights hospitalization, therefore appropriate for inpatient status.        * Acute respiratory failure with hypoxia (HCC)  Assessment & Plan  On 3L -- wean off as tolerated  Likely due to  above  Check echo    Moderate protein-calorie malnutrition (HCC)  Assessment & Plan  Ensure  PO diet as tolerated  Body mass index is 19.4 kg/m².      Underweight  Assessment & Plan  Ensure  PO diet as tolerated    Age-related physical debility  Assessment & Plan  Fall precautions  PT/OT    H/O: CVA (cerebrovascular accident)  Assessment & Plan  ASA/statin    Pleural effusion, bilateral  Assessment & Plan  Large on right, moderate on left noted on CT  US-thoracocentesis ordered  Check echo  Gentle diuresis - lasix 20mg IV BID  IR consult    Dyslipidemia- (present on admission)  Assessment & Plan  statin    Essential hypertension- (present on admission)  Assessment & Plan  Lasix as above  Resume home amlodipine when appropriate    ACP (advance care planning)  Assessment & Plan  Discussed GOC with patient in  ED. He does not want any invasive measures including CPR/defibrillation/intubation and/or mechanical ventilation.  He is confirmed for DNR/DNI status.  He is however agreeable for proposed procedure of thoracocentesis and diuresis.  ACP: 17mins        VTE prophylaxis: SCDs/TEDs

## 2023-04-12 NOTE — PROGRESS NOTES
"[facility] Internal Medicine Daily Progress Note    Date of Service  4/12/2023    R Team: R  Orange Team   Attending: Contreras Stark M.d.  Senior Resident: Dr. Lima  Intern:  Dr. Vang  Contact Number: [phone]    Hospital Course  Dave Santana is a 89 y.o. male with past medical history of Hypertension, Dyslipidemia who was admitted 4/11/2023 with diagnosis of Acute hypoxic respiratory failure due to bilateral pleural effusion (larger in right). Patient reported shortness of breath has been progressively worsening \"for a long time\" and even more in the past week prior to admission.     Interval Problem Update  No acute events overnight per nursing staff and/or medical team. Patient notes shortness of breath persists but improved with O2 support; denies chest pain, lightheadedness, palpitations, nausea or other specific concerns. Discussed with niece (Nicky Pearce) regarding current plan; will update her daily. Placed nutrition consult for evaluation and recommendations. US guided thoracentesis: 2700 cc drained, sent for studies.     I have discussed this patient's plan of care and discharge plan at IDT rounds today with Case Management, Nursing, Nursing leadership, and other members of the IDT team.    Code Status  DNAR/DNI    Disposition  Patient is not medically cleared for discharge.   Anticipate discharge to to home with close outpatient follow-up.  I have placed the appropriate orders for post-discharge needs.    Review of Systems  Review of Systems   Constitutional:  Negative for chills, fever and weight loss.   HENT:  Negative for congestion and sore throat.    Eyes:  Negative for blurred vision and redness.   Respiratory:  Positive for shortness of breath. Negative for cough and sputum production.    Cardiovascular:  Negative for chest pain and palpitations.   Gastrointestinal:  Negative for abdominal pain, constipation, diarrhea, heartburn, nausea and vomiting.   Genitourinary:  Negative for " dysuria.   Musculoskeletal:  Negative for back pain, joint pain and myalgias.   Skin:  Negative for rash.   Neurological:  Negative for dizziness, loss of consciousness, weakness and headaches.   Endo/Heme/Allergies:  Negative for environmental allergies.   Psychiatric/Behavioral:  Negative for depression and substance abuse. The patient is not nervous/anxious and does not have insomnia.       Physical Exam  Temp:  [36.1 °C (96.9 °F)-36.6 °C (97.8 °F)] 36.3 °C (97.4 °F)  Pulse:  [65-88] 80  Resp:  [16-36] 18  BP: (110-172)/(48-91) 130/60  SpO2:  [89 %-100 %] 90 %    Physical Exam  Constitutional:       General: He is not in acute distress.     Appearance: Normal appearance. He is not ill-appearing.   HENT:      Head: Normocephalic.      Ears:      Comments: Decreased hearing.      Nose: Nose normal. No congestion.      Mouth/Throat:      Mouth: Mucous membranes are dry.   Eyes:      General: No scleral icterus.     Pupils: Pupils are equal, round, and reactive to light.   Cardiovascular:      Rate and Rhythm: Normal rate and regular rhythm.      Pulses: Normal pulses.      Heart sounds: No murmur heard.    No gallop.   Pulmonary:      Effort: No respiratory distress.      Breath sounds: Normal breath sounds. No wheezing.   Abdominal:      General: Abdomen is flat. There is no distension.      Palpations: Abdomen is soft. There is no mass.      Tenderness: There is no abdominal tenderness.      Hernia: No hernia is present.   Musculoskeletal:         General: No swelling or tenderness.      Cervical back: No rigidity or tenderness.      Right lower leg: No edema.      Left lower leg: No edema.   Skin:     General: Skin is warm.      Capillary Refill: Capillary refill takes less than 2 seconds.      Coloration: Skin is not jaundiced or pale.      Findings: No bruising.      Comments: Decreased skin turgor.   Decreased subcutaneous fat tissue: temporal wasting   Neurological:      General: No focal deficit present.       Mental Status: He is alert and oriented to person, place, and time.      Cranial Nerves: No cranial nerve deficit.      Sensory: No sensory deficit.      Motor: Weakness present.       Fluids    Intake/Output Summary (Last 24 hours) at 4/12/2023 1721  Last data filed at 4/12/2023 1300  Gross per 24 hour   Intake 250 ml   Output 1800 ml   Net -1550 ml       Laboratory  Recent Labs     04/11/23  1650 04/12/23  0035   WBC 7.0 5.4   RBC 4.56* 4.43*   HEMOGLOBIN 12.7* 12.3*   HEMATOCRIT 42.2 40.3*   MCV 92.5 91.0   MCH 27.9 27.8   MCHC 30.1* 30.5*   RDW 45.9 44.6   PLATELETCT 268 234   MPV 9.9 10.1     Recent Labs     04/11/23 1650 04/12/23  0035 04/12/23  0957   SODIUM 143 149* 144   POTASSIUM 4.8 4.5 4.0   CHLORIDE 101 104 99   CO2 36* 34* 35*   GLUCOSE 107* 95 97   BUN 23* 25* 23*   CREATININE 0.56 0.54 0.64   CALCIUM 8.9 9.0 9.2     Assessment/Plan  Problem Representation:    89 y.o. male with past medical history of Hypertension, Dyslipidemia, and CVA. Admitted 04/11/23 with diagnosis of Acute hypoxic respiratory failure due to bilateral pleural effusions (larger in the right). 04/12 US guided thoracentesis: 2700 cc drained, pending study results.     * Acute respiratory failure with hypoxia (HCC)  Assessment & Plan  Presented with respiratory distress and hypoxia.   Etiology: Right pleural effusion.   Imaging: CT chest - large right pleural effusion, moderate left pleural effusion, right lung collapse.  O2 supplementation: 3 liters by nasal cannula.       Pleural effusion, bilateral  Assessment & Plan  CT Chest 04/11/23: Large right pleural effusion, moderate left pleural effusion, right lung collapse  O2 supplementation: 3 liters by nasal cannula.   Unclear etiology.   Discontinued Furosemide.   US guided thoracentesis (therapeutic + diagnostic): 2700 cc drained.   Pleural analysis studies pending.     Moderate protein-calorie malnutrition (HCC)  Assessment & Plan  Underweight.   Loss of muscle mass.  Loss of  subcutaneous fat.   Placed Nutrition consult.     Dyslipidemia- (present on admission)  Assessment & Plan  Lipid panel 2018: Chol 146, Triglycerides 104, HDL 34, LDL 91  ASCVD-10 score:   Statin: Atorvastatin 40 mg (high intensity)  Diet and lifestyle changes discussed with patient.            VTE prophylaxis: SCDs/TEDs    I have performed a physical exam and reviewed and updated ROS and Plan today (4/12/2023). In review of yesterday's note (4/11/2023), there are no changes except as documented above.

## 2023-04-12 NOTE — ASSESSMENT & PLAN NOTE
IMPROVED    Presented with respiratory distress and hypoxia.   Etiology: Right pleural effusion.   Imaging: CT chest - large right pleural effusion, moderate left pleural effusion, right lung collapse.  O2 supplementation: 2 liters by nasal cannula.  SpO2 97%.   04/12/23: US guided pleural effusion.

## 2023-04-12 NOTE — WOUND TEAM
[facility]  Inpatient Services  Wound and Skin Care Brief Evaluation    Admission Date: 4/11/2023     Last order of IP CONSULT TO WOUND CARE was found on 4/12/2023 from Hospital Encounter on 4/11/2023     HPI, PMH, SH: Reviewed    Chief Complaint   Patient presents with    Shortness of Breath     Pt BIB EMS from a 's assisted living facility. Pt has a recent diagnosis of pneumonia and is sent by facility for complaints of increasing SOB. Pt has been on 5L of oxygen at his facility and is not requiring an increased supplemental oxygen at this time. Pt has been taking Cefdinir since 04/02/23.     Diagnosis: Pleural effusion on right [J90]    Unit where seen by Wound Team: S141/00     Wound consult placed regarding sacrum. Chart and images reviewed. This discussed with bedside RN. This RN in to assess patient. Pt pleasant and agreeable. Sacrococcygeal region red and blanching with scattered friction-related partial thickness wounds to gluteal cleft & L buttocks that are also blanching. Barrier paste applied. No pressure injuries or advanced wound care needs identified. Wound consult completed.  Wound team signing off, re-consult if patient has further advanced wound care needs or if previously assessed area shows signs of deterioration.        RSKIN:   CURRENTLY IN PLACE (X), APPLIED THIS VISIT (A), ORDERED (O):   Q shift Prince:  X  Q shift pressure point assessments:  X    Surface/Positioning   Standard/Trauma Bed        X But on NYA mattress, pump is not turning on  Low Airloss          Bariatric NAY     ICU NYA                              Waffle cushion        Waffle Overlay        O  Reposition q 2 hours    X  TAPs Turning system   O  Z Juancarlos Pillow     Offloading/Redistribution   Sacral Offloading Dressing (Silicone dressing)     Heel Offloading Dressing (Silicone dressing)         Heel float boots (Prevalon boot)             Float Heels off Bed with Pillows           Respiratory   Silicone  O2 tubing       X  Gray Foam Ear protectors   X  Cannula fixation Device (Tender )          High flow offloading Clip    Elastic head band offloading device      Anchorfast                                                         Trach with Optifoam split foam             Containment/Moisture Prevention     Rectal tube or BMS    Purwick/Condom Cath      X  Reich Catheter    Barrier wipes     O      Barrier paste     A  Antifungal tx      Interdry        Mobilization Not assessed this visit      Up to chair        Ambulate      PT/OT      Nutrition Not assessed this visit      Dietician        Diabetes Education      PO     TF     TPN     NPO   # day

## 2023-04-12 NOTE — ASSESSMENT & PLAN NOTE
Discussed GOC with patient in  ED. He does not want any invasive measures including CPR/defibrillation/intubation and/or mechanical ventilation.  He is confirmed for DNR/DNI status.  He is however agreeable for proposed procedure of thoracocentesis and diuresis.  ACP: 17mins

## 2023-04-12 NOTE — PROGRESS NOTES
4 Eyes Skin Assessment Completed by EMELI Magallon and EMELI Arechiga.    Head WDL  Ears WDL  Nose WDL  Mouth WDL  Neck WDL  Breast/Chest WDL  Shoulder Blades WDL  Spine WDL  (R) Arm/Elbow/Hand WDL  (L) Arm/Elbow/Hand WDL  Abdomen WDL  Groin WDL  Scrotum/Coccyx/Buttocks Redness, Non-Blanching, and Excoriation  (R) Leg Redness and Blanching  (L) Leg WDL  (R) Heel/Foot/Toe Redness and Blanching  (L) Heel/Foot/Toe WDL          Devices In Places Tele Box, Blood Pressure Cuff, Pulse Ox, and Nasal Cannula      Interventions In Place Sacral Mepilex, Waffle Overlay, Pillows, Q2 Turns, and Pressure Redistribution Mattress    Possible Skin Injury Yes    Pictures Uploaded Into Epic Yes  Wound Consult Placed Yes  RN Wound Prevention Protocol Ordered Yes

## 2023-04-12 NOTE — ASSESSMENT & PLAN NOTE
CT Chest 04/11/23: Large right pleural effusion, moderate left pleural effusion, right lung collapse  O2 supplementation: 3 liters by nasal cannula.   US guided thoracentesis (therapeutic + diagnostic): 2700 cc drained.   Pleural analysis suggests exudative effusion.   Cytology negative for malignant cells (Sensitivity for malignant pleural effusion +/- 58%).   Unclear etiology: Malignant highly likely due to weight loss, elevated lymphocytes in pleural fluid VS Infectious, decreased glucose and elevated lymphocytes in pleural effusion.   04/14: CXR - improved right sided pleural effusion; no repeat US thoracentesis in the meantime.

## 2023-04-12 NOTE — RESPIRATORY CARE
COPD EDUCATION by COPD CLINICAL EDUCATOR  4/12/2023 at 9:42 AM by Debi Schrader RRT     Patient reviewed by COPD education team. Patient does not have a diagnosis history of COPD, does not take respiratory medications and has never smoked. COPD exacerbation order set was utilized for non-COPD diagnosis, admitting diagnosis is Right Pleural Effusion. Therefore the patient does not qualify for the COPD program and the COPD Coordinator Consult will be discontinued.

## 2023-04-12 NOTE — ASSESSMENT & PLAN NOTE
Large on right, moderate on left noted on CT  US-thoracocentesis ordered  Check echo  Gentle diuresis - lasix 20mg IV BID  IR consult

## 2023-04-12 NOTE — DIETARY
"Nutrition services: Day 1 of admit.  Dave Santana is a 89 y.o. male with admitting DX of Pleural effusion on right.    Consult received for Failure to Thrive and moderate protein-calorie malnutrition/underweight on problem list.     Attempted to see at bedside x 2, pt and bed are not in room at this time, appears pt is at ultrasound.    Assessment:  Height: 182.9 cm (6' 0.01\")  Weight: 64.9 kg (143 lb 1.3 oz) - bed scale  Body mass index is 19.4 kg/m²., BMI classification: normal  Diet/Intake: Regular, no PO intake yet    Evaluation:   PMH of dyslipidemia and HTN. Per H&P pt is poor historian.   MD noting appears cachectic with temporal wasting and weight loss.   No admit screen done.   Per weight hx pt ranging 152-165 lbs during admit in 2018. Weight overall down but would not be significant amount in time frame > 1 year.   Skin: PT L buttocks/coccyx. No edema noted. Wound team saw today and notes no pressure injuries.  Labs: BUN 23, Phos 2.3  Meds: lipitor, prilosec, bowel protocol  Last BM: 4/11    Malnutrition Risk: Unable to fully assess, no ASPEN criteria met at this time.     Recommendations/Plan:  Agree with regular diet. Will order Boost VHC with meals to promote protein and kcals.   Encourage intake of meals and supplements.   Document intake of all meals and supplements as % taken in ADL's to provide interdisciplinary communication across all shifts.   Monitor weight.  Nutrition rep will continue to see patient for ongoing meal and snack preferences.     RD following.         "

## 2023-04-12 NOTE — DISCHARGE PLANNING
Case Management Discharge Planning    Admission Date: 4/11/2023  GMLOS: 3.5  ALOS: 1    6-Clicks ADL Score: 15  6-Clicks Mobility Score: 11  PT and/or OT Eval ordered: No  Post-acute Referrals Ordered: No  Post-acute Choice Obtained: No  Has referral(s) been sent to post-acute provider:  No      Anticipated Discharge Dispo:      DME Needed: No    Action(s) Taken: OTHER    LSW received call from Mason at [facility]. Patient is a resident here and can discharge back there once medically clear.     Escalations Completed: None    Medically Clear: No    Next Steps: LSW to follow up with patient and medical team regarding discharge needs and barriers.     Barriers to Discharge: Medical clearance

## 2023-04-13 ENCOUNTER — APPOINTMENT (OUTPATIENT)
Dept: RADIOLOGY | Facility: MEDICAL CENTER | Age: 88
DRG: 189 | End: 2023-04-13
Attending: HOSPITALIST
Payer: COMMERCIAL

## 2023-04-13 ENCOUNTER — APPOINTMENT (OUTPATIENT)
Dept: RADIOLOGY | Facility: MEDICAL CENTER | Age: 88
DRG: 189 | End: 2023-04-13
Attending: STUDENT IN AN ORGANIZED HEALTH CARE EDUCATION/TRAINING PROGRAM
Payer: COMMERCIAL

## 2023-04-13 PROBLEM — E83.39 HYPOPHOSPHATEMIA: Status: ACTIVE | Noted: 2023-04-13

## 2023-04-13 PROBLEM — N13.30 HYDRONEPHROSIS OF RIGHT KIDNEY: Status: ACTIVE | Noted: 2023-04-13

## 2023-04-13 LAB
ALBUMIN SERPL BCP-MCNC: 2.7 G/DL (ref 3.2–4.9)
ALBUMIN/GLOB SERPL: 0.9 G/DL
ALP SERPL-CCNC: 109 U/L (ref 30–99)
ALT SERPL-CCNC: 6 U/L (ref 2–50)
ANION GAP SERPL CALC-SCNC: 13 MMOL/L (ref 7–16)
AST SERPL-CCNC: 10 U/L (ref 12–45)
BILIRUB SERPL-MCNC: 0.4 MG/DL (ref 0.1–1.5)
BUN SERPL-MCNC: 27 MG/DL (ref 8–22)
CALCIUM ALBUM COR SERPL-MCNC: 9.9 MG/DL (ref 8.5–10.5)
CALCIUM SERPL-MCNC: 8.9 MG/DL (ref 8.5–10.5)
CHLORIDE SERPL-SCNC: 100 MMOL/L (ref 96–112)
CO2 SERPL-SCNC: 30 MMOL/L (ref 20–33)
CREAT SERPL-MCNC: 0.65 MG/DL (ref 0.5–1.4)
CRP SERPL HS-MCNC: 2.84 MG/DL (ref 0–0.75)
GFR SERPLBLD CREATININE-BSD FMLA CKD-EPI: 86 ML/MIN/1.73 M 2
GLOBULIN SER CALC-MCNC: 3 G/DL (ref 1.9–3.5)
GLUCOSE SERPL-MCNC: 95 MG/DL (ref 65–99)
LDH SERPL L TO P-CCNC: 186 U/L (ref 107–266)
MAGNESIUM SERPL-MCNC: 1.8 MG/DL (ref 1.5–2.5)
PHOSPHATE SERPL-MCNC: 2.4 MG/DL (ref 2.5–4.5)
POTASSIUM SERPL-SCNC: 3.7 MMOL/L (ref 3.6–5.5)
PROT SERPL-MCNC: 5.7 G/DL (ref 6–8.2)
RHODAMINE-AURAMINE STN SPEC: NORMAL
SIGNIFICANT IND 70042: NORMAL
SITE SITE: NORMAL
SODIUM SERPL-SCNC: 143 MMOL/L (ref 135–145)
SOURCE SOURCE: NORMAL
URATE SERPL-MCNC: 5.3 MG/DL (ref 2.5–8.3)

## 2023-04-13 PROCEDURE — 700117 HCHG RX CONTRAST REV CODE 255: Performed by: STUDENT IN AN ORGANIZED HEALTH CARE EDUCATION/TRAINING PROGRAM

## 2023-04-13 PROCEDURE — A9270 NON-COVERED ITEM OR SERVICE: HCPCS

## 2023-04-13 PROCEDURE — 76604 US EXAM CHEST: CPT

## 2023-04-13 PROCEDURE — 83615 LACTATE (LD) (LDH) ENZYME: CPT

## 2023-04-13 PROCEDURE — 71046 X-RAY EXAM CHEST 2 VIEWS: CPT

## 2023-04-13 PROCEDURE — 84550 ASSAY OF BLOOD/URIC ACID: CPT

## 2023-04-13 PROCEDURE — 83735 ASSAY OF MAGNESIUM: CPT

## 2023-04-13 PROCEDURE — 99233 SBSQ HOSP IP/OBS HIGH 50: CPT | Mod: GC | Performed by: HOSPITALIST

## 2023-04-13 PROCEDURE — 700102 HCHG RX REV CODE 250 W/ 637 OVERRIDE(OP): Performed by: STUDENT IN AN ORGANIZED HEALTH CARE EDUCATION/TRAINING PROGRAM

## 2023-04-13 PROCEDURE — 770020 HCHG ROOM/CARE - TELE (206)

## 2023-04-13 PROCEDURE — 84100 ASSAY OF PHOSPHORUS: CPT

## 2023-04-13 PROCEDURE — 51798 US URINE CAPACITY MEASURE: CPT

## 2023-04-13 PROCEDURE — 80053 COMPREHEN METABOLIC PANEL: CPT

## 2023-04-13 PROCEDURE — A9270 NON-COVERED ITEM OR SERVICE: HCPCS | Performed by: STUDENT IN AN ORGANIZED HEALTH CARE EDUCATION/TRAINING PROGRAM

## 2023-04-13 PROCEDURE — 36415 COLL VENOUS BLD VENIPUNCTURE: CPT

## 2023-04-13 PROCEDURE — 86140 C-REACTIVE PROTEIN: CPT

## 2023-04-13 PROCEDURE — 700102 HCHG RX REV CODE 250 W/ 637 OVERRIDE(OP)

## 2023-04-13 PROCEDURE — 86480 TB TEST CELL IMMUN MEASURE: CPT

## 2023-04-13 PROCEDURE — 74178 CT ABD&PLV WO CNTR FLWD CNTR: CPT

## 2023-04-13 RX ADMIN — OMEPRAZOLE 20 MG: 20 CAPSULE, DELAYED RELEASE ORAL at 16:58

## 2023-04-13 RX ADMIN — FLUTICASONE PROPIONATE 100 MCG: 50 SPRAY, METERED NASAL at 05:56

## 2023-04-13 RX ADMIN — IOHEXOL 70 ML: 350 INJECTION, SOLUTION INTRAVENOUS at 10:15

## 2023-04-13 RX ADMIN — DIBASIC SODIUM PHOSPHATE, MONOBASIC POTASSIUM PHOSPHATE AND MONOBASIC SODIUM PHOSPHATE 250 MG: 852; 155; 130 TABLET ORAL at 16:58

## 2023-04-13 RX ADMIN — OMEPRAZOLE 20 MG: 20 CAPSULE, DELAYED RELEASE ORAL at 05:01

## 2023-04-13 RX ADMIN — ATORVASTATIN CALCIUM 40 MG: 40 TABLET, FILM COATED ORAL at 16:58

## 2023-04-13 ASSESSMENT — ENCOUNTER SYMPTOMS
CONSTIPATION: 0
SHORTNESS OF BREATH: 1
WEIGHT LOSS: 0
HEARTBURN: 0
WEAKNESS: 0
COUGH: 0
NAUSEA: 0
FEVER: 0
PALPITATIONS: 0
NERVOUS/ANXIOUS: 0
DIARRHEA: 0
CHILLS: 0
SORE THROAT: 0
DIZZINESS: 0
HEADACHES: 0
INSOMNIA: 0
VOMITING: 0
ABDOMINAL PAIN: 0
DEPRESSION: 0
MYALGIAS: 0
SPUTUM PRODUCTION: 0
EYE REDNESS: 0
LOSS OF CONSCIOUSNESS: 0
BLURRED VISION: 0
BACK PAIN: 0

## 2023-04-13 ASSESSMENT — LIFESTYLE VARIABLES: SUBSTANCE_ABUSE: 0

## 2023-04-13 NOTE — CARE PLAN
The patient is Watcher - Medium risk of patient condition declining or worsening    Shift Goals  Clinical Goals: Monitor respiratory status/O2  Patient Goals: Rest  Family Goals: USHA    Progress made toward(s) clinical / shift goals:    Problem: Risk for Infection - COPD  Goal: Patient will remain free from signs and symptoms of infection  Outcome: Progressing  Flowsheets (Taken 4/13/2023 0002)  Standard Infection Interventions:   Assessed for signs and symptoms of infection   Implemented standard precautions   Instructed patient/family on signs and symptoms of infection   Provided education on proper hand hygiene and infection prevention measures  Note: Pt aware of S/S of infection to look out for. Discussed POC with pt. Pt denies any questions.       Problem: Respiratory  Goal: Patient will achieve/maintain optimum respiratory ventilation and gas exchange  Outcome: Progressing  Note: Respiratory status stable. Pt O2 remains stable on 2L NC.     Problem: Fall Risk  Goal: Patient will remain free from falls  Outcome: Progressing  Note: Pt remains free from falls. Bed alarm is set. Utilizes call bell appropriately to call nursing staff for assistance when needed.

## 2023-04-13 NOTE — PROGRESS NOTES
Telemetry report:      Rhythm: NSR  Heart Rate: 73 - 90  Ectopy: PVC, PAC    TN: 0.18  QRS: 0.10  QT: 0.48    Per tele strip from monitor room:

## 2023-04-13 NOTE — PROGRESS NOTES
"[facility] Internal Medicine Daily Progress Note    Date of Service  4/13/2023    R Team: R  Orange Team   Attending: Contreras Stark M.d.  Senior Resident: Dr. Lima  Intern:  Dr. Vang  Contact Number: [phone]    Hospital Course  Dave Santana is a 89 y.o. male with past medical history of Hypertension, Dyslipidemia who was admitted 4/11/2023 with diagnosis of Acute hypoxic respiratory failure due to bilateral pleural effusion (larger in right). Patient reported shortness of breath has been progressively worsening \"for a long time\" and even more in the past week prior to admission. 04/12: US guided thoracentesis: 2700 cc drained, post procedure CXR no pneumothorax; fluid sent for analysis.     Interval Problem Update  No acute events overnight per nursing staff and/or medical team. Patient not expressing shortness of breath, chest pain or other specific discomforts; notices improvement in breathing post thoracentesis. Pleural fluid analysis suggestive of exudative effusion. X-Ray in AM persistence of pleural effusion; will continue to assess and consider thoracentesis VS chest tube if fluid progressively accumulates. CT-Abdomen/Pelvis performed to assess for right hydronephrosis: right hydronephrosis with proximal to mid ureter dilation likely due to filling defect/mass, no left hydronephrosis. Pending cytology results for pleural fluid.     I have discussed this patient's plan of care and discharge plan at IDT rounds today with Case Management, Nursing, Nursing leadership, and other members of the IDT team.    Code Status  DNAR/DNI    Disposition  Patient is not medically cleared for discharge.   Anticipate discharge to to home with close outpatient follow-up.  I have placed the appropriate orders for post-discharge needs.    Review of Systems  Review of Systems   Constitutional:  Negative for chills, fever and weight loss.   HENT:  Negative for congestion and sore throat.    Eyes:  Negative for " blurred vision and redness.   Respiratory:  Positive for shortness of breath. Negative for cough and sputum production.    Cardiovascular:  Negative for chest pain and palpitations.   Gastrointestinal:  Negative for abdominal pain, constipation, diarrhea, heartburn, nausea and vomiting.   Genitourinary:  Negative for dysuria.   Musculoskeletal:  Negative for back pain, joint pain and myalgias.   Skin:  Negative for rash.   Neurological:  Negative for dizziness, loss of consciousness, weakness and headaches.   Endo/Heme/Allergies:  Negative for environmental allergies.   Psychiatric/Behavioral:  Negative for depression and substance abuse. The patient is not nervous/anxious and does not have insomnia.       Physical Exam  Temp:  [36.5 °C (97.7 °F)-36.9 °C (98.5 °F)] 36.9 °C (98.5 °F)  Pulse:  [75-82] 78  Resp:  [14-18] 18  BP: (118-148)/(54-81) 131/54  SpO2:  [94 %-97 %] 94 %    Physical Exam  Constitutional:       General: He is not in acute distress.     Appearance: Normal appearance. He is not ill-appearing.   HENT:      Head: Normocephalic.      Ears:      Comments: Decreased hearing.      Nose: Nose normal. No congestion.      Mouth/Throat:      Mouth: Mucous membranes are dry.   Eyes:      General: No scleral icterus.     Pupils: Pupils are equal, round, and reactive to light.   Cardiovascular:      Rate and Rhythm: Normal rate and regular rhythm.      Pulses: Normal pulses.      Heart sounds: No murmur heard.    No gallop.   Pulmonary:      Effort: No respiratory distress.      Breath sounds: Normal breath sounds. No wheezing.   Abdominal:      General: Abdomen is flat. There is no distension.      Palpations: Abdomen is soft. There is no mass.      Tenderness: There is no abdominal tenderness.      Hernia: No hernia is present.   Musculoskeletal:         General: No swelling or tenderness.      Cervical back: No rigidity or tenderness.      Right lower leg: No edema.      Left lower leg: No edema.   Skin:      General: Skin is warm.      Capillary Refill: Capillary refill takes less than 2 seconds.      Coloration: Skin is not jaundiced or pale.      Findings: No bruising.      Comments: Decreased skin turgor.   Decreased subcutaneous fat tissue: temporal wasting   Neurological:      General: No focal deficit present.      Mental Status: He is alert and oriented to person, place, and time.      Cranial Nerves: No cranial nerve deficit.      Sensory: No sensory deficit.      Motor: Weakness present.     Laboratory  Recent Labs     04/11/23  1650 04/12/23  0035   WBC 7.0 5.4   RBC 4.56* 4.43*   HEMOGLOBIN 12.7* 12.3*   HEMATOCRIT 42.2 40.3*   MCV 92.5 91.0   MCH 27.9 27.8   MCHC 30.1* 30.5*   RDW 45.9 44.6   PLATELETCT 268 234   MPV 9.9 10.1     Recent Labs     04/12/23  0035 04/12/23  0957 04/13/23  0611   SODIUM 149* 144 143   POTASSIUM 4.5 4.0 3.7   CHLORIDE 104 99 100   CO2 34* 35* 30   GLUCOSE 95 97 95   BUN 25* 23* 27*   CREATININE 0.54 0.64 0.65   CALCIUM 9.0 9.2 8.9     Assessment/Plan  Problem Representation:    89 y.o. male with past medical history of Hypertension, Dyslipidemia, and CVA. Admitted 04/11/23 with diagnosis of Acute hypoxic respiratory failure due to bilateral pleural effusions (larger in the right). 04/12 US guided thoracentesis: 2700 cc drained; exudative effusion. Pending cytology.     * Acute respiratory failure with hypoxia (HCC)  Assessment & Plan  Presented with respiratory distress and hypoxia.   Etiology: Right pleural effusion.   Imaging: CT chest - large right pleural effusion, moderate left pleural effusion, right lung collapse.  O2 supplementation: 3 liters by nasal cannula.       Pleural effusion, bilateral  Assessment & Plan  CT Chest 04/11/23: Large right pleural effusion, moderate left pleural effusion, right lung collapse  O2 supplementation: 3 liters by nasal cannula.   Unclear etiology.   Discontinued Furosemide.   US guided thoracentesis (therapeutic + diagnostic): 2700 cc  drained.   Pleural analysis suggests exudative effusion.   Pending cytology.     Will consider repeat thoracentesis VS chest tube placement if fluid progressively re-accumulates.     Hydronephrosis of right kidney  Assessment & Plan  04/13/23: CT Abdomen/pelvis - Right hydronephrosis with proximal to mid ureter dilation likely due to filling defect/mass, no left hydronephrosis.   Bladder scan performed: 380.   Patient has condom catheter.   Will consider consult to Urology if pertinent.     Hypophosphatemia  Assessment & Plan  PO4 04/12/23: 2.3; repletion indicated.   PO4 04/13/23: 2.4; repletion indicated.     Monitoring for possible refeeding syndrome.     Moderate protein-calorie malnutrition (HCC)  Assessment & Plan  Underweight.   Loss of muscle mass.  Loss of subcutaneous fat.   Placed Nutrition consult.     Dyslipidemia- (present on admission)  Assessment & Plan  Lipid panel 2018: Chol 146, Triglycerides 104, HDL 34, LDL 91  ASCVD-10 score:   Statin: Atorvastatin 40 mg (high intensity)  Diet and lifestyle changes discussed with patient.            VTE prophylaxis: SCDs/TEDs    I have performed a physical exam and reviewed and updated ROS and Plan today (4/13/2023). In review of yesterday's note (4/12/2023), there are no changes except as documented above.

## 2023-04-13 NOTE — ASSESSMENT & PLAN NOTE
PO4 04/12/23: 2.3; repletion indicated.   PO4 04/13/23: 2.4; repletion indicated.   PO4 04/14/23: 1.7; indicated 500 mg Phosphorus PO. PM control: 1.9; pending to complete repletion.   PO4 04/15/23: 2.7    Monitoring for possible refeeding syndrome.

## 2023-04-13 NOTE — PROGRESS NOTES
Ongoing care no acute issues at this time. Patient left floor for Procedure around 1300 came back with no changes at 1530. See Op note. 2700cc fluids off R lung. Patinet  breathing better on 2L NC. Patient resting in bed safely with no c/o anything at this time. Will continue care and monitoring as ordered.

## 2023-04-13 NOTE — CARE PLAN
Problem: Ineffective Airway Clearance  Goal: Patient will maintain patent airway with clear/clearing breath sounds  Outcome: Progressing see test results     Problem: Impaired Gas Exchange  Goal: Patient will demonstrate improved ventilation and adequate oxygenation and participate in treatment regimen within the level of ability/situation.  Outcome: Progressing o2 as needed, turning from side to side.    The patient is Stable - Low risk of patient condition declining or worsening    Shift Goals  Clinical Goals: Stable respiratory  Patient Goals: sleep  Family Goals: alka    Progress made toward(s) clinical / shift goals:  Assumed care of patient at 0715. Received bedside report from night shift RN. Bed is locked and lowest position, call light within reach. Treaded socks in place. Patient updated on plan of care, no complaints or pain at this time. White board updated. Pt AxOx3-4 needs reminders. Very [place], amplifier in room being used.   Patient breathing pattern is unlabored on 2LNC. Pt is tele. Condom Cath in place.  All needs met at this time. Will continue care and monitoring as ordered.

## 2023-04-13 NOTE — ASSESSMENT & PLAN NOTE
04/13/23: CT Abdomen/pelvis - Right hydronephrosis with proximal to mid ureter dilation likely due to filling defect/mass, no left hydronephrosis.   Bladder scan performed: 380.   Patient has condom catheter.   Urology consulted (04/14/23): No urgent surgical management; trend renal function.

## 2023-04-14 ENCOUNTER — APPOINTMENT (OUTPATIENT)
Dept: RADIOLOGY | Facility: MEDICAL CENTER | Age: 88
DRG: 189 | End: 2023-04-14
Payer: COMMERCIAL

## 2023-04-14 PROBLEM — E87.6 HYPOKALEMIA: Status: ACTIVE | Noted: 2023-04-14

## 2023-04-14 PROBLEM — I10 ESSENTIAL HYPERTENSION: Status: RESOLVED | Noted: 2018-08-14 | Resolved: 2023-04-14

## 2023-04-14 LAB
ALBUMIN SERPL BCP-MCNC: 2.6 G/DL (ref 3.2–4.9)
ALBUMIN SERPL BCP-MCNC: 2.7 G/DL (ref 3.2–4.9)
ALBUMIN/GLOB SERPL: 1 G/DL
ALBUMIN/GLOB SERPL: 1.1 G/DL
ALP SERPL-CCNC: 92 U/L (ref 30–99)
ALP SERPL-CCNC: 94 U/L (ref 30–99)
ALT SERPL-CCNC: 12 U/L (ref 2–50)
ALT SERPL-CCNC: 12 U/L (ref 2–50)
ANION GAP SERPL CALC-SCNC: 10 MMOL/L (ref 7–16)
ANION GAP SERPL CALC-SCNC: 5 MMOL/L (ref 7–16)
AST SERPL-CCNC: 18 U/L (ref 12–45)
AST SERPL-CCNC: 18 U/L (ref 12–45)
BILIRUB SERPL-MCNC: 0.3 MG/DL (ref 0.1–1.5)
BILIRUB SERPL-MCNC: 0.4 MG/DL (ref 0.1–1.5)
BUN SERPL-MCNC: 42 MG/DL (ref 8–22)
BUN SERPL-MCNC: 44 MG/DL (ref 8–22)
CALCIUM ALBUM COR SERPL-MCNC: 9.5 MG/DL (ref 8.5–10.5)
CALCIUM ALBUM COR SERPL-MCNC: 9.7 MG/DL (ref 8.5–10.5)
CALCIUM SERPL-MCNC: 8.4 MG/DL (ref 8.5–10.5)
CALCIUM SERPL-MCNC: 8.7 MG/DL (ref 8.5–10.5)
CHLORIDE SERPL-SCNC: 98 MMOL/L (ref 96–112)
CHLORIDE SERPL-SCNC: 99 MMOL/L (ref 96–112)
CO2 SERPL-SCNC: 35 MMOL/L (ref 20–33)
CO2 SERPL-SCNC: 40 MMOL/L (ref 20–33)
CREAT SERPL-MCNC: 0.84 MG/DL (ref 0.5–1.4)
CREAT SERPL-MCNC: 0.84 MG/DL (ref 0.5–1.4)
EKG IMPRESSION: NORMAL
GAMMA INTERFERON BACKGROUND BLD IA-ACNC: 0.07 IU/ML
GFR SERPLBLD CREATININE-BSD FMLA CKD-EPI: 80 ML/MIN/1.73 M 2
GFR SERPLBLD CREATININE-BSD FMLA CKD-EPI: 80 ML/MIN/1.73 M 2
GLOBULIN SER CALC-MCNC: 2.5 G/DL (ref 1.9–3.5)
GLOBULIN SER CALC-MCNC: 2.6 G/DL (ref 1.9–3.5)
GLUCOSE SERPL-MCNC: 102 MG/DL (ref 65–99)
GLUCOSE SERPL-MCNC: 112 MG/DL (ref 65–99)
M TB IFN-G BLD-IMP: NEGATIVE
M TB IFN-G CD4+ BCKGRND COR BLD-ACNC: 0.06 IU/ML
MAGNESIUM SERPL-MCNC: 1.8 MG/DL (ref 1.5–2.5)
MAGNESIUM SERPL-MCNC: 2 MG/DL (ref 1.5–2.5)
MITOGEN IGNF BCKGRD COR BLD-ACNC: >10 IU/ML
PHOSPHATE SERPL-MCNC: 1.7 MG/DL (ref 2.5–4.5)
PHOSPHATE SERPL-MCNC: 1.9 MG/DL (ref 2.5–4.5)
POTASSIUM SERPL-SCNC: 3.2 MMOL/L (ref 3.6–5.5)
POTASSIUM SERPL-SCNC: 3.5 MMOL/L (ref 3.6–5.5)
PROT SERPL-MCNC: 5.2 G/DL (ref 6–8.2)
PROT SERPL-MCNC: 5.2 G/DL (ref 6–8.2)
QFT TB2 - NIL TBQ2: 0.1 IU/ML
SODIUM SERPL-SCNC: 143 MMOL/L (ref 135–145)
SODIUM SERPL-SCNC: 144 MMOL/L (ref 135–145)
TROPONIN T SERPL-MCNC: 59 NG/L (ref 6–19)
TROPONIN T SERPL-MCNC: 69 NG/L (ref 6–19)

## 2023-04-14 PROCEDURE — A9270 NON-COVERED ITEM OR SERVICE: HCPCS

## 2023-04-14 PROCEDURE — 93010 ELECTROCARDIOGRAM REPORT: CPT | Performed by: INTERNAL MEDICINE

## 2023-04-14 PROCEDURE — A9270 NON-COVERED ITEM OR SERVICE: HCPCS | Performed by: STUDENT IN AN ORGANIZED HEALTH CARE EDUCATION/TRAINING PROGRAM

## 2023-04-14 PROCEDURE — 84484 ASSAY OF TROPONIN QUANT: CPT | Mod: 91

## 2023-04-14 PROCEDURE — 80053 COMPREHEN METABOLIC PANEL: CPT | Mod: 91

## 2023-04-14 PROCEDURE — 700105 HCHG RX REV CODE 258: Performed by: HOSPITALIST

## 2023-04-14 PROCEDURE — 97165 OT EVAL LOW COMPLEX 30 MIN: CPT

## 2023-04-14 PROCEDURE — 700111 HCHG RX REV CODE 636 W/ 250 OVERRIDE (IP)

## 2023-04-14 PROCEDURE — 83735 ASSAY OF MAGNESIUM: CPT

## 2023-04-14 PROCEDURE — 36415 COLL VENOUS BLD VENIPUNCTURE: CPT

## 2023-04-14 PROCEDURE — 770020 HCHG ROOM/CARE - TELE (206)

## 2023-04-14 PROCEDURE — 71046 X-RAY EXAM CHEST 2 VIEWS: CPT

## 2023-04-14 PROCEDURE — 94669 MECHANICAL CHEST WALL OSCILL: CPT

## 2023-04-14 PROCEDURE — 700102 HCHG RX REV CODE 250 W/ 637 OVERRIDE(OP): Performed by: STUDENT IN AN ORGANIZED HEALTH CARE EDUCATION/TRAINING PROGRAM

## 2023-04-14 PROCEDURE — 97163 PT EVAL HIGH COMPLEX 45 MIN: CPT

## 2023-04-14 PROCEDURE — 93005 ELECTROCARDIOGRAM TRACING: CPT | Performed by: HOSPITALIST

## 2023-04-14 PROCEDURE — 99232 SBSQ HOSP IP/OBS MODERATE 35: CPT | Mod: GC | Performed by: HOSPITALIST

## 2023-04-14 PROCEDURE — 700102 HCHG RX REV CODE 250 W/ 637 OVERRIDE(OP)

## 2023-04-14 PROCEDURE — 84100 ASSAY OF PHOSPHORUS: CPT | Mod: 91

## 2023-04-14 PROCEDURE — 81001 URINALYSIS AUTO W/SCOPE: CPT

## 2023-04-14 RX ORDER — SODIUM CHLORIDE 9 MG/ML
INJECTION, SOLUTION INTRAVENOUS CONTINUOUS
Status: DISCONTINUED | OUTPATIENT
Start: 2023-04-14 | End: 2023-04-16 | Stop reason: HOSPADM

## 2023-04-14 RX ORDER — MAGNESIUM SULFATE HEPTAHYDRATE 40 MG/ML
2 INJECTION, SOLUTION INTRAVENOUS ONCE
Status: COMPLETED | OUTPATIENT
Start: 2023-04-14 | End: 2023-04-14

## 2023-04-14 RX ORDER — POTASSIUM CHLORIDE 7.45 MG/ML
10 INJECTION INTRAVENOUS
Status: COMPLETED | OUTPATIENT
Start: 2023-04-14 | End: 2023-04-14

## 2023-04-14 RX ADMIN — DIBASIC SODIUM PHOSPHATE, MONOBASIC POTASSIUM PHOSPHATE AND MONOBASIC SODIUM PHOSPHATE 500 MG: 852; 155; 130 TABLET ORAL at 09:51

## 2023-04-14 RX ADMIN — SODIUM CHLORIDE: 9 INJECTION, SOLUTION INTRAVENOUS at 16:10

## 2023-04-14 RX ADMIN — DIBASIC SODIUM PHOSPHATE, MONOBASIC POTASSIUM PHOSPHATE AND MONOBASIC SODIUM PHOSPHATE 500 MG: 852; 155; 130 TABLET ORAL at 23:30

## 2023-04-14 RX ADMIN — POTASSIUM CHLORIDE 10 MEQ: 7.46 INJECTION, SOLUTION INTRAVENOUS at 16:11

## 2023-04-14 RX ADMIN — POTASSIUM CHLORIDE 10 MEQ: 7.46 INJECTION, SOLUTION INTRAVENOUS at 13:31

## 2023-04-14 RX ADMIN — OMEPRAZOLE 20 MG: 20 CAPSULE, DELAYED RELEASE ORAL at 17:37

## 2023-04-14 RX ADMIN — ATORVASTATIN CALCIUM 40 MG: 40 TABLET, FILM COATED ORAL at 17:37

## 2023-04-14 RX ADMIN — ASPIRIN 81 MG: 81 TABLET, COATED ORAL at 05:07

## 2023-04-14 RX ADMIN — ACETAMINOPHEN 650 MG: 325 TABLET, FILM COATED ORAL at 02:09

## 2023-04-14 RX ADMIN — POTASSIUM CHLORIDE 10 MEQ: 7.46 INJECTION, SOLUTION INTRAVENOUS at 18:04

## 2023-04-14 RX ADMIN — FLUTICASONE PROPIONATE 100 MCG: 50 SPRAY, METERED NASAL at 05:07

## 2023-04-14 RX ADMIN — MAGNESIUM SULFATE HEPTAHYDRATE 2 G: 40 INJECTION, SOLUTION INTRAVENOUS at 11:32

## 2023-04-14 RX ADMIN — OMEPRAZOLE 20 MG: 20 CAPSULE, DELAYED RELEASE ORAL at 05:07

## 2023-04-14 RX ADMIN — ACETAMINOPHEN 650 MG: 325 TABLET, FILM COATED ORAL at 09:13

## 2023-04-14 RX ADMIN — DIBASIC SODIUM PHOSPHATE, MONOBASIC POTASSIUM PHOSPHATE AND MONOBASIC SODIUM PHOSPHATE 500 MG: 852; 155; 130 TABLET ORAL at 17:37

## 2023-04-14 RX ADMIN — POTASSIUM CHLORIDE 10 MEQ: 7.46 INJECTION, SOLUTION INTRAVENOUS at 12:28

## 2023-04-14 ASSESSMENT — COGNITIVE AND FUNCTIONAL STATUS - GENERAL
TOILETING: TOTAL
MOBILITY SCORE: 8
CLIMB 3 TO 5 STEPS WITH RAILING: TOTAL
EATING MEALS: TOTAL
WALKING IN HOSPITAL ROOM: TOTAL
PERSONAL GROOMING: TOTAL
SUGGESTED CMS G CODE MODIFIER MOBILITY: CM
MOVING TO AND FROM BED TO CHAIR: A LOT
TURNING FROM BACK TO SIDE WHILE IN FLAT BAD: A LOT
DRESSING REGULAR LOWER BODY CLOTHING: TOTAL
STANDING UP FROM CHAIR USING ARMS: TOTAL
SUGGESTED CMS G CODE MODIFIER DAILY ACTIVITY: CN
HELP NEEDED FOR BATHING: TOTAL
DAILY ACTIVITIY SCORE: 6
MOVING FROM LYING ON BACK TO SITTING ON SIDE OF FLAT BED: UNABLE
DRESSING REGULAR UPPER BODY CLOTHING: TOTAL

## 2023-04-14 ASSESSMENT — ENCOUNTER SYMPTOMS
SPUTUM PRODUCTION: 0
SHORTNESS OF BREATH: 1
COUGH: 0
MYALGIAS: 0
SORE THROAT: 0
FEVER: 0
NAUSEA: 0
CONSTIPATION: 0
NERVOUS/ANXIOUS: 0
BACK PAIN: 0
EYE REDNESS: 0
HEARTBURN: 0
HEADACHES: 0
FLANK PAIN: 0
CHILLS: 0
WEAKNESS: 1
DEPRESSION: 0
WEAKNESS: 0
VOMITING: 0
DIZZINESS: 0
INSOMNIA: 0
LOSS OF CONSCIOUSNESS: 0
PALPITATIONS: 0
DIARRHEA: 0
ABDOMINAL PAIN: 0
WEIGHT LOSS: 0
BLURRED VISION: 0

## 2023-04-14 ASSESSMENT — ACTIVITIES OF DAILY LIVING (ADL): TOILETING: DEPENDENT

## 2023-04-14 ASSESSMENT — FIBROSIS 4 INDEX: FIB4 SCORE: 2.11

## 2023-04-14 ASSESSMENT — GAIT ASSESSMENTS: GAIT LEVEL OF ASSIST: UNABLE TO PARTICIPATE

## 2023-04-14 ASSESSMENT — PAIN DESCRIPTION - PAIN TYPE
TYPE: ACUTE PAIN
TYPE: ACUTE PAIN

## 2023-04-14 ASSESSMENT — LIFESTYLE VARIABLES: SUBSTANCE_ABUSE: 0

## 2023-04-14 NOTE — DIETARY
Nutrition Services Brief Update:    Day 3 of admit.  Dave Santana is a 89 y.o. male with admitting DX of Pleural effusion on right [J90]    Current Diet: Level 5 - minced/moist; Level 0 - thin liquids w/ Supplements    Per ADLs, pt eating <25-50% of meals so far. RD attempted to interview pt at bed side. Pt did not wake to name being called. RD able to visualize severe muscle and fat wasting in the orbital, temporal, buccal, masseter and clavicle regions. Continue to monitor intake. Interview pt as able.     Malnutrition risk: Per ASPEN guidelines, pt meets criteria for severe malnutrition in the context of chronic illness as evidence by severe muscle and fat wasting in multiple regions.     Problem: Nutritional:  Goal: Achieve adequate nutritional intake  Description: Patient will consume >/= of meals  Outcome: Slowly progressing.     RD following.

## 2023-04-14 NOTE — PROGRESS NOTES
"[facility] Internal Medicine Daily Progress Note    Date of Service  4/14/2023    R Team: R IM Orange Team   Attending: Contreras Stark M.d.  Senior Resident: Dr. Lima  Intern:  Dr. Vang  Contact Number: [phone]    Hospital Course  Dave Santana is a 89 y.o. male with past medical history of Hypertension, Dyslipidemia who was admitted 4/11/2023 with diagnosis of Acute hypoxic respiratory failure due to bilateral pleural effusion (larger in right). Patient reported shortness of breath has been progressively worsening \"for a long time\" and even more in the past week prior to admission. 04/12: US guided thoracentesis: 2700 cc drained, post procedure CXR no pneumothorax; fluid sent for analysis: exudative.      Interval Problem Update  No acute events overnight per nursing staff and/or medical team. Patient not expressing worsening shortness of breath or new onset chest pain, palpitations or other specific symptoms. Telemetry called referring beats of V-Tach, nursing staff ordered STAT ECG: bigeminy, anterolateral ischemia likely. PO4 1.7, K 3.5, Mg 1.8; indicated repletion. High sensitivity Troponin 69 (elevated).     Afternoon: Shortness of breath improved; CXR - improved right pleural effusion. Will not request US thoracentesis of right lung at this time. Pending PM Troponin. CMP, PO4, Mg for 04/15 ordered.     I have discussed this patient's plan of care and discharge plan at IDT rounds today with Case Management, Nursing, Nursing leadership, and other members of the IDT team.    Code Status  DNAR/DNI    Disposition  Patient is not medically cleared for discharge.   Anticipate discharge to to home with close outpatient follow-up.  I have placed the appropriate orders for post-discharge needs.    Review of Systems  Review of Systems   Constitutional:  Negative for chills, fever and weight loss.   HENT:  Negative for congestion and sore throat.    Eyes:  Negative for blurred vision and redness. "   Respiratory:  Positive for shortness of breath. Negative for cough and sputum production.    Cardiovascular:  Negative for chest pain and palpitations.   Gastrointestinal:  Negative for abdominal pain, constipation, diarrhea, heartburn, nausea and vomiting.   Genitourinary:  Negative for dysuria.   Musculoskeletal:  Negative for back pain, joint pain and myalgias.   Skin:  Negative for rash.   Neurological:  Negative for dizziness, loss of consciousness, weakness and headaches.   Endo/Heme/Allergies:  Negative for environmental allergies.   Psychiatric/Behavioral:  Negative for depression and substance abuse. The patient is not nervous/anxious and does not have insomnia.       Physical Exam  Temp:  [36.4 °C (97.5 °F)-37.3 °C (99.2 °F)] 36.6 °C (97.9 °F)  Pulse:  [60-81] 69  Resp:  [14-20] 16  BP: (117-137)/(51-62) 131/56  SpO2:  [92 %-99 %] 99 %    Physical Exam  Constitutional:       General: He is not in acute distress.     Appearance: Normal appearance. He is not ill-appearing.   HENT:      Head: Normocephalic.      Ears:      Comments: Decreased hearing.      Nose: Nose normal. No congestion.      Mouth/Throat:      Mouth: Mucous membranes are dry.   Eyes:      General: No scleral icterus.     Pupils: Pupils are equal, round, and reactive to light.   Cardiovascular:      Rate and Rhythm: Normal rate and regular rhythm.      Pulses: Normal pulses.      Heart sounds: No murmur heard.    No gallop.   Pulmonary:      Effort: Pulmonary effort is normal. No respiratory distress.      Breath sounds: Normal breath sounds. No wheezing.   Abdominal:      General: Abdomen is flat. There is no distension.      Palpations: Abdomen is soft. There is no mass.      Tenderness: There is no abdominal tenderness.      Hernia: No hernia is present.   Musculoskeletal:         General: No swelling or tenderness.      Cervical back: No rigidity or tenderness.      Right lower leg: No edema.      Left lower leg: No edema.   Skin:      General: Skin is warm.      Capillary Refill: Capillary refill takes less than 2 seconds.      Coloration: Skin is not jaundiced or pale.      Findings: No bruising.      Comments: Decreased skin turgor.   Decreased subcutaneous fat tissue: temporal wasting   Neurological:      General: No focal deficit present.      Mental Status: He is alert and oriented to person, place, and time.      Cranial Nerves: No cranial nerve deficit.      Sensory: No sensory deficit.      Motor: Weakness present.     Laboratory  Recent Labs     04/12/23  0035   WBC 5.4   RBC 4.43*   HEMOGLOBIN 12.3*   HEMATOCRIT 40.3*   MCV 91.0   MCH 27.8   MCHC 30.5*   RDW 44.6   PLATELETCT 234   MPV 10.1     Recent Labs     04/13/23  0611 04/14/23  0656 04/14/23  1233   SODIUM 143 143 144   POTASSIUM 3.7 3.5* 3.2*   CHLORIDE 100 98 99   CO2 30 40* 35*   GLUCOSE 95 102* 112*   BUN 27* 42* 44*   CREATININE 0.65 0.84 0.84   CALCIUM 8.9 8.7 8.4*     Assessment/Plan  Problem Representation:    89 y.o. male with past medical history of Hypertension, Dyslipidemia, and CVA. Admitted 04/11/23 with diagnosis of Acute hypoxic respiratory failure due to bilateral pleural effusions (larger in the right). 04/12 US guided thoracentesis: 2700 cc drained; exudative effusion. Pending cytology. Repletion of K and Phosphorus indicated due to low levels.     * Acute respiratory failure with hypoxia (HCC)  Assessment & Plan  IMPROVED    Presented with respiratory distress and hypoxia.   Etiology: Right pleural effusion.   Imaging: CT chest - large right pleural effusion, moderate left pleural effusion, right lung collapse.  O2 supplementation: 2 liters by nasal cannula.  SpO2 97%.   04/12/23: US guided pleural effusion.     Pleural effusion, bilateral  Assessment & Plan  CT Chest 04/11/23: Large right pleural effusion, moderate left pleural effusion, right lung collapse  O2 supplementation: 3 liters by nasal cannula.   US guided thoracentesis (therapeutic + diagnostic):  2700 cc drained.   Pleural analysis suggests exudative effusion.   Unclear etiology: Malignant highly likely due to weight loss, elevated lymphocytes in pleural fluid VS Infectious, decreased glucose and elevated lymphocytes in pleural effusion.   04/14: CXR - improved right sided pleural effusion; no repeat US thoracentesis in the meantime.  Pending cytology of pleural fluid.     Hydronephrosis of right kidney  Assessment & Plan  04/13/23: CT Abdomen/pelvis - Right hydronephrosis with proximal to mid ureter dilation likely due to filling defect/mass, no left hydronephrosis.   Bladder scan performed: 380.   Patient has condom catheter.   Urology consulted (04/14/23): No urgent surgical management; trend renal function.     Hypophosphatemia  Assessment & Plan  PO4 04/12/23: 2.3; repletion indicated.   PO4 04/13/23: 2.4; repletion indicated.   PO 04/14/23: 1.7; indicated 500 mg Phosphorus PO. PM control: 1.9; pending to complete repletion.     Monitoring for possible refeeding syndrome.     Elevated troponin  Assessment & Plan  04/14/23: High-sensitivity Troponin level of 69.   Possibly due to demand ischemia.   ECG suggested anterolateral ischemia d/t T wave inversions.   Patient not referring chest pain, worsening SOB or other specific symptoms.   Pending repeat Troponin to assess if progressive increase.       Hypokalemia  Assessment & Plan  04/14/23: K level: 3.5; indicated IV repletion of 20 mEq. Repeat PM level: 3.2; indicated 20 mEq IV added to prior 20 mEq received.     Moderate protein-calorie malnutrition (HCC)  Assessment & Plan  Underweight.   Loss of muscle mass.  Loss of subcutaneous fat.   Placed Nutrition consult.     Dyslipidemia- (present on admission)  Assessment & Plan  Lipid panel 2018: Chol 146, Triglycerides 104, HDL 34, LDL 91  ASCVD-10 score:   Statin: Atorvastatin 40 mg (high intensity)  Diet and lifestyle changes discussed with patient.            VTE prophylaxis: SCDs/TEDs    I have  performed a physical exam and reviewed and updated ROS and Plan today (4/14/2023). In review of yesterday's note (4/13/2023), there are no changes except as documented above.

## 2023-04-14 NOTE — CONSULTS
[facility] [place] Consult/H&P Note    Primary Service: Hospital team  Attending: Contreras Stark M.D.  Patient's Name/MRN: Dave Santana, [id number]    Admit Date:4/11/2023  Today's Date: 4/14/2023   Length of stay:  LOS: 3 days   Room #: S141/00      Reason for consult/chief complaint: Hydronephrosis of right kidney  ID/HPI: Dave Santana is a 89 y.o. male patient admitted for dyspnea and pleural effusion, was found to have incidental moderate right hydroureteronephrosis on CT, therefore urology was consulted.  Patient has condom catheter and bladder scan shows 380cc.  Patient denies flank pain.  Denies dysuria, hematuria.  Cr WNL 0.94       Past Medical History:   Past Medical History:   Diagnosis Date    Dyslipidemia     Hypertension         Past Surgical History:   Past Surgical History:   Procedure Laterality Date    OTHER CARDIAC SURGERY      OTHER NEUROLOGICAL SURG          Family History:   Family History   Problem Relation Age of Onset    Heart Attack Mother     Stroke Father          Social History:   Social History     Tobacco Use    Smoking status: Never    Smokeless tobacco: Current     Types: Chew   Substance Use Topics    Alcohol use: No    Drug use: No      Social History     Social History Narrative    Not on file        Allergies: he Lidocaine and Procaine    Medications:   Medications Prior to Admission   Medication Sig Dispense Refill Last Dose    diclofenac sodium (VOLTAREN) 1 % Gel Apply 2-4 g topically 4 times a day as needed. ANKLES, KNEES, LEFT SCAPULA   4/11/2023 at AM    fluticasone (FLONASE) 50 MCG/ACT nasal spray Administer 2 Sprays into affected nostril(S) every day.   4/11/2023 at AM    polyethylene glycol/lytes (MIRALAX) 17 g Pack Take 17 g by mouth every day.   4/11/2023 at AM    amLODIPine (NORVASC) 2.5 MG Tab Take 2.5 mg by mouth 2 times a day.   4/11/2023 at AM    cefdinir (OMNICEF) 300 MG Cap Take 300 mg by mouth 2 times a day. Pt completed a 10 day course of CEFDINIR on 4/11    "4/11/2023 at AM    acetaminophen (TYLENOL) 160 MG/5ML Suspension Take 640 mg by mouth every 6 hours as needed. 20 ml (640 mg)  Indications: Pain   4/9/2023 at PRN         Review of Systems  Review of Systems   Constitutional:  Negative for chills and fever.   Respiratory:  Positive for shortness of breath.    Gastrointestinal:  Negative for abdominal pain, nausea and vomiting.   Genitourinary:  Negative for flank pain and hematuria.   Neurological:  Positive for weakness.   All other systems reviewed and are negative.     Physical Exam  VITAL SIGNS: /57   Pulse 73   Temp 36.4 °C (97.6 °F) (Temporal)   Resp 20   Ht 1.829 m (6' 0.01\")   Wt 63.1 kg (139 lb 1.8 oz)   SpO2 99%   BMI 18.86 kg/m²   Physical Exam  Vitals reviewed.   Constitutional:       Appearance: He is ill-appearing.      Comments: frail   HENT:      Head: Normocephalic and atraumatic.      Nose: Nose normal.   Genitourinary:     Comments: Condom catheter in place draining clear yellow urine  Neurological:      Mental Status: He is alert.         Labs:  Recent Labs     04/11/23  1650 04/12/23  0035   WBC 7.0 5.4   RBC 4.56* 4.43*   HEMOGLOBIN 12.7* 12.3*   HEMATOCRIT 42.2 40.3*   MCV 92.5 91.0   MCH 27.9 27.8   MCHC 30.1* 30.5*   RDW 45.9 44.6   PLATELETCT 268 234   MPV 9.9 10.1     Recent Labs     04/12/23  0957 04/13/23  0611 04/14/23  0656   SODIUM 144 143 143   POTASSIUM 4.0 3.7 3.5*   CHLORIDE 99 100 98   CO2 35* 30 40*   GLUCOSE 97 95 102*   BUN 23* 27* 42*   CREATININE 0.64 0.65 0.84   CALCIUM 9.2 8.9 8.7     Recent Labs     04/12/23  0957   APTT 32.2   INR 1.11     Glucose:  Recent Labs     04/12/23  0035 04/12/23  0957 04/13/23  0611 04/14/23  0656   GLUCOSE 95 97 95 102*     Coags:  Recent Labs     04/12/23  0957   INR 1.11         Urinalysis:   No results for input(s): COLORURINE, CLARITY, SPECGRAVITY, PHURINE, GLUCOSEUR, KETONES, NITRITE, OCCULTBLOOD, RBCURINE, BACTERIA, EPITHELCELL in the last 72 hours.    Invalid input(s): " BILRUBINUR, LEUESTERASE    Imaging:  DX-CHEST-2 VIEWS   Final Result      Probable improvement in small bilateral pleural effusions.      CT-ABDOMEN-PELVIS WITH & W/O   Final Result         1.  At least moderate right hydronephrosis. There is residual contrast within the right renal collecting system from CT study 2 days prior suggesting obstruction. The proximal to mid right ureter is dilated with abrupt nonopacification in the mid right    ureter suggesting a underlying nonspecific filling defect/mass. Consider retrograde pyelography/ureterogram to further evaluate as clinically indicated.   2.  No left hydronephrosis.   3.  Small-to-moderate right and small left pleural effusions.   4.  Diverticulosis without acute diverticulitis.               US-CHEST   Final Result      Only small amount of left pleural fluid which is inadequate for thoracentesis.      DX-CHEST-2 VIEWS   Final Result      1.  Layering pleural effusions, right greater than left.         EC-ECHOCARDIOGRAM LTD W/ CONT   Final Result      US-THORACENTESIS PUNCTURE RIGHT   Final Result      1. Ultrasound guided right sided therapeutic and diagnostic thoracentesis.      2. 2700 mL of fluid withdrawn..      US-RENAL   Final Result      1.  Moderate right hydroureteronephrosis.      DX-CHEST-PORTABLE (1 VIEW)   Final Result      1.  Interval decrease in size in right pleural effusion status post thoracentesis without evidence of pneumothorax.      CT-CHEST (THORAX) WITH   Final Result      1.  Large right pleural effusion occupies the vast majority of the right hemithorax.      2.  Collapse of the majority of the right lung with aeration present only in the anterior right upper pulmonary lobe.      3.  Moderate size left pleural effusion.      4.  Incidentally noted in partially visualized right hydronephrosis.      Fleischner Society pulmonary nodule recommendations:   Not Applicable         DX-CHEST-PORTABLE (1 VIEW)   Final Result         Large  right pleural effusion.          @lastct@     Assessment/Recommendation   89 y.o.M with admitted for dyspnea and pleural effusion, was found to have incidental moderate right hydroureteronephrosis on CT.  The proximal to mid right ureter is dilated with abrupt nonopacification in the mid right ureter suggesting a underlying nonspecific filling defect/mass.    Plan for now would be to trend renal function and send urine for cytology.  Unless the Cr rises or patient develops pain in right flank, would recommend outpatient follow up with BMP and ROXANNE in 4 weeks outpatient  Urology will follow    Dr Hutson is aware of this consultation and has directed the POC.        Karen Farmer, P.A.   [address] [place] [address]   [phone]

## 2023-04-14 NOTE — ASSESSMENT & PLAN NOTE
04/14/23: High-sensitivity Troponin level of 69.   04/14/23: repeated high-sensitivity Troponin 59  Possibly due to demand ischemia.   ECG suggested anterolateral ischemia d/t T wave inversions.   Patient not referring chest pain, worsening SOB or other specific symptoms.

## 2023-04-14 NOTE — THERAPY
Occupational Therapy   Initial Evaluation     Patient Name: Dave Santana  Age:  89 y.o., Sex:  male  Medical Record #: [id number]  Today's Date: 4/14/2023          Assessment  Patient is 89 y.o. male with a diagnosis of SOB.  Pt is at or near his/her functional baseline. Pt with no further skilled OT needs in the acute care setting at this time.  (At his baseline)    Plan    Occupational Therapy Initial Treatment Plan   Duration: (P) Discharge Needs Only       Discharge Recommendations: (P) Other - (back to [facility])        04/14/23 1044   Prior Living Situation   Prior Services Unable To Determine At This Time   Housing / Facility Assisted Living Residence  ([facility])   Equipment Owned Wheelchair   Lives with - Patient's Self Care Capacity Attendant / Paid Care Giver   Comments pt having difficulty giving PLOF info   Prior Level of ADL Function   Self Feeding Dependent   Grooming / Hygiene Dependent   Bathing Dependent   Dressing Dependent   Toileting Dependent   Bed Mobility    Supine to Sit Total Assist   Sit to Supine Total Assist   Scooting Total Assist   Rolling Total Assist to Rt.;Total Assist to Lt.   ADL Assessment   Grooming Total Assist   Upper Body Dressing Total Assist   Lower Body Dressing Total Assist   Toileting Total Assist   Functional Mobility   Sit to Stand Unable to Participate   Bed, Chair, Wheelchair Transfer Unable to Participate   Occupational Therapy Initial Treatment Plan    Duration Discharge Needs Only   Problem List   Problem List Other (Comments)  (pt is dependent with ALs and mobility)   Anticipated Discharge Equipment and Recommendations   Discharge Recommendations Other -  (back to [facility])

## 2023-04-14 NOTE — ASSESSMENT & PLAN NOTE
04/14/23: K 3.5; indicated IV repletion of 20 mEq. Repeat PM level: 3.2; indicated 20 mEq IV added to prior 20 mEq received.   04/15/23: K 3.5

## 2023-04-14 NOTE — PROGRESS NOTES
Ongoing care no acute issues at this time. Patient breathing well but no changes still on 2L NC. 2 view xray and CT abd complete see results. Condom cath on CDI. Good UO. Bladder scan 380 see chart. Plan for R thora tomorrow. Patient resting in bed safely with no c/o anything at this time q2 turns. Will continue care and monitoring as ordered.

## 2023-04-14 NOTE — CARE PLAN
The patient is Watcher - Medium risk of patient condition declining or worsening    Shift Goals  Clinical Goals: Monitor O2/resiratory status  Patient Goals: Rest  Family Goals: alka    Progress made toward(s) clinical / shift goals:    Problem: Respiratory  Goal: Patient will achieve/maintain optimum respiratory ventilation and gas exchange  Outcome: Progressing  Note: Respiratory status stable. Pt O2 remains stable on 2L NC. Continuous pulse ox in place to monitor respiratory status.     Problem: Fall Risk  Goal: Patient will remain free from falls  Outcome: Progressing  Note: Pt remains free from falls. Bed alarm is set. Utilizes call bell appropriately to call nursing staff for assistance when needed.

## 2023-04-14 NOTE — PROGRESS NOTES
Received phone call from tele monitoring. Per tele monitoring had some beats of v tach. EKG stat order. Physician notified

## 2023-04-14 NOTE — THERAPY
"Physical Therapy   Initial Evaluation     Patient Name: Dave Santana  Age:  89 y.o., Sex:  male  Medical Record #: [id number]  Today's Date: 4/14/2023     Precautions  Precautions: Fall Risk    Assessment  Patient is 89 y.o.  frail, cachectic male with h/o hypertension, CVA who presented 4/11/2023 with evaluation for shortness of breath.  History limited as patient is severely presbycusis and poor historian..  Additional factors influencing patient status / progress : today, pt needs total assists to come to EOB, unable to lean forward, heavy posterior lean despite education and support. Pt is limited by fear of falling and reports that staff at [facility] assist as needed and that \"I don't walk\" and that \"I spend most of my time in bed\". Pt appears to be at a total care level. See details below. Patient will not be actively followed for physical therapy services at this time, however may be seen if requested by physician for 1 more visit within 30 days to address any discharge or equipment needs. .      Plan         DC Equipment Recommendations: Unable to determine at this time  Discharge Recommendations: Anticipate that the patient will have no further physical therapy needs after discharge from the hospital. Patient will not be actively followed for physical therapy services at this time, however may be seen if requested by physician for 1 more visit within 30 days to address any discharge or equipment needs.             Objective       04/14/23 1050   Charge Group   PT Evaluation PT Evaluation High   Total Time Spent   PT Total Time Yes   PT Evaluation Time Spent (Mins) 30   PT Total Time Spent (Calculated) 30   Initial Contact Note    Initial Contact Note Order Received and Verified, Evaluation Only - Patient Does Not Require Further Acute Physical Therapy at this Time.  However, May Benefit from Post Acute Therapy for Higher Level Functional Deficits.   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   Prior Level of Functional Mobility   Bed Mobility Required Assist   Transfer Status Required Assist   Ambulation Dependent   Wheelchair Required Assist   Comments Pt states \"I don't walk\"   Active ROM Lower Body    Active ROM Lower Body  X   Comments B LE limited by weakness   Strength Lower Body   Lower Body Strength  X   Gross Strength Generalized Weakness, Equal Bilaterally   Balance Assessment   Sitting Balance (Static) Poor   Sitting Balance (Dynamic) Poor -   Weight Shift Sitting Poor   Comments heavy posterior push in sitting. very fearful.   Bed Mobility    Supine to Sit Total Assist   Sit to Supine Total Assist   Scooting Total Assist   Rolling Total Assist to Rt.;Total Assist to Lt.   Gait Analysis   Gait Level Of Assist Unable to Participate   Functional Mobility   Sit to Stand Unable to Participate   Bed, Chair, Wheelchair Transfer Unable to Participate   Comments pt continues with posterior lean despite education and support.   How much difficulty does the patient currently have...   Turning over in bed (including adjusting bedclothes, sheets and blankets)? 2   Sitting down on and standing up from a chair with arms (e.g., wheelchair, bedside commode, etc.) 1   Moving from lying on back to sitting on the side of the bed? 2   How much help from another person does the patient currently need...   Moving to and from a bed to a chair (including a " wheelchair)? 1   Need to walk in a hospital room? 1   Climbing 3-5 steps with a railing? 1   6 clicks Mobility Score 8   Activity Tolerance   Sitting Edge of Bed 5+   Education Group   Education Provided Role of Physical Therapist   Role of Physical Therapist Patient Response Patient;Acceptance;Explanation;Verbal Demonstration   Anticipated Discharge Equipment and Recommendations   DC Equipment Recommendations Unable to determine at this time   Discharge Recommendations Anticipate that the patient will have no further physical therapy needs after discharge from the hospital   Interdisciplinary Plan of Care Collaboration   IDT Collaboration with  Nursing   Patient Position at End of Therapy In Bed;Call Light within Reach;Tray Table within Reach   Collaboration Comments nsg updated   Session Information   Date / Session Number  4/14-1x only, dc needs only

## 2023-04-15 ENCOUNTER — APPOINTMENT (OUTPATIENT)
Dept: RADIOLOGY | Facility: MEDICAL CENTER | Age: 88
DRG: 189 | End: 2023-04-15
Attending: STUDENT IN AN ORGANIZED HEALTH CARE EDUCATION/TRAINING PROGRAM
Payer: COMMERCIAL

## 2023-04-15 LAB
ALBUMIN SERPL BCP-MCNC: 2.6 G/DL (ref 3.2–4.9)
ALBUMIN/GLOB SERPL: 1 G/DL
ALP SERPL-CCNC: 102 U/L (ref 30–99)
ALT SERPL-CCNC: 10 U/L (ref 2–50)
ANION GAP SERPL CALC-SCNC: 6 MMOL/L (ref 7–16)
APPEARANCE UR: CLEAR
AST SERPL-CCNC: 20 U/L (ref 12–45)
BACTERIA #/AREA URNS HPF: NEGATIVE /HPF
BACTERIA FLD AEROBE CULT: NORMAL
BASE EXCESS BLDA CALC-SCNC: 5 MMOL/L (ref -4–3)
BILIRUB SERPL-MCNC: 0.3 MG/DL (ref 0.1–1.5)
BILIRUB UR QL STRIP.AUTO: NEGATIVE
BODY FLD TYPE: NORMAL
BODY TEMPERATURE: 36.9 CENTIGRADE
BUN SERPL-MCNC: 42 MG/DL (ref 8–22)
CALCIUM ALBUM COR SERPL-MCNC: 9.5 MG/DL (ref 8.5–10.5)
CALCIUM SERPL-MCNC: 8.4 MG/DL (ref 8.5–10.5)
CHLORIDE SERPL-SCNC: 101 MMOL/L (ref 96–112)
CO2 SERPL-SCNC: 38 MMOL/L (ref 20–33)
COLOR UR: YELLOW
CREAT FLD-MCNC: 0.59 MG/DL
CREAT SERPL-MCNC: 0.81 MG/DL (ref 0.5–1.4)
GFR SERPLBLD CREATININE-BSD FMLA CKD-EPI: 81 ML/MIN/1.73 M 2
GLOBULIN SER CALC-MCNC: 2.7 G/DL (ref 1.9–3.5)
GLUCOSE SERPL-MCNC: 111 MG/DL (ref 65–99)
GLUCOSE UR STRIP.AUTO-MCNC: NEGATIVE MG/DL
GRAM STN SPEC: NORMAL
HCO3 BLDA-SCNC: 29 MMOL/L (ref 17–25)
KETONES UR STRIP.AUTO-MCNC: NEGATIVE MG/DL
LEUKOCYTE ESTERASE UR QL STRIP.AUTO: NEGATIVE
MAGNESIUM SERPL-MCNC: 2.1 MG/DL (ref 1.5–2.5)
MICRO URNS: ABNORMAL
NITRITE UR QL STRIP.AUTO: NEGATIVE
PCO2 BLDA: 41 MMHG (ref 26–37)
PH BLDA: 7.47 [PH] (ref 7.4–7.5)
PH UR STRIP.AUTO: 8 [PH] (ref 5–8)
PHOSPHATE SERPL-MCNC: 2.7 MG/DL (ref 2.5–4.5)
PO2 BLDA: 71.8 MMHG (ref 64–87)
POTASSIUM SERPL-SCNC: 3.5 MMOL/L (ref 3.6–5.5)
PROT SERPL-MCNC: 5.3 G/DL (ref 6–8.2)
PROT UR QL STRIP: 30 MG/DL
RBC # URNS HPF: ABNORMAL /HPF
RBC UR QL AUTO: NEGATIVE
SAO2 % BLDA: 93.6 % (ref 93–99)
SIGNIFICANT IND 70042: NORMAL
SITE SITE: NORMAL
SODIUM SERPL-SCNC: 145 MMOL/L (ref 135–145)
SOURCE SOURCE: NORMAL
SP GR UR STRIP.AUTO: 1.03
UROBILINOGEN UR STRIP.AUTO-MCNC: 2 MG/DL
WBC #/AREA URNS HPF: ABNORMAL /HPF
YEAST BUDDING URNS QL: PRESENT /HPF
YEAST HYPHAE #/AREA URNS HPF: PRESENT /HPF

## 2023-04-15 PROCEDURE — 87449 NOS EACH ORGANISM AG IA: CPT

## 2023-04-15 PROCEDURE — 83735 ASSAY OF MAGNESIUM: CPT

## 2023-04-15 PROCEDURE — 84100 ASSAY OF PHOSPHORUS: CPT

## 2023-04-15 PROCEDURE — A9270 NON-COVERED ITEM OR SERVICE: HCPCS | Performed by: STUDENT IN AN ORGANIZED HEALTH CARE EDUCATION/TRAINING PROGRAM

## 2023-04-15 PROCEDURE — 700105 HCHG RX REV CODE 258: Performed by: HOSPITALIST

## 2023-04-15 PROCEDURE — 71045 X-RAY EXAM CHEST 1 VIEW: CPT

## 2023-04-15 PROCEDURE — 770020 HCHG ROOM/CARE - TELE (206)

## 2023-04-15 PROCEDURE — A9270 NON-COVERED ITEM OR SERVICE: HCPCS

## 2023-04-15 PROCEDURE — 36415 COLL VENOUS BLD VENIPUNCTURE: CPT

## 2023-04-15 PROCEDURE — 700102 HCHG RX REV CODE 250 W/ 637 OVERRIDE(OP): Performed by: STUDENT IN AN ORGANIZED HEALTH CARE EDUCATION/TRAINING PROGRAM

## 2023-04-15 PROCEDURE — 94760 N-INVAS EAR/PLS OXIMETRY 1: CPT

## 2023-04-15 PROCEDURE — 99232 SBSQ HOSP IP/OBS MODERATE 35: CPT | Mod: GC | Performed by: HOSPITALIST

## 2023-04-15 PROCEDURE — 700105 HCHG RX REV CODE 258

## 2023-04-15 PROCEDURE — 80053 COMPREHEN METABOLIC PANEL: CPT

## 2023-04-15 PROCEDURE — 82803 BLOOD GASES ANY COMBINATION: CPT

## 2023-04-15 PROCEDURE — 700102 HCHG RX REV CODE 250 W/ 637 OVERRIDE(OP)

## 2023-04-15 PROCEDURE — 700111 HCHG RX REV CODE 636 W/ 250 OVERRIDE (IP): Performed by: STUDENT IN AN ORGANIZED HEALTH CARE EDUCATION/TRAINING PROGRAM

## 2023-04-15 RX ORDER — POTASSIUM CHLORIDE 7.45 MG/ML
10 INJECTION INTRAVENOUS
Status: COMPLETED | OUTPATIENT
Start: 2023-04-15 | End: 2023-04-15

## 2023-04-15 RX ADMIN — DIBASIC SODIUM PHOSPHATE, MONOBASIC POTASSIUM PHOSPHATE AND MONOBASIC SODIUM PHOSPHATE 500 MG: 852; 155; 130 TABLET ORAL at 12:47

## 2023-04-15 RX ADMIN — ATORVASTATIN CALCIUM 40 MG: 40 TABLET, FILM COATED ORAL at 16:57

## 2023-04-15 RX ADMIN — SODIUM CHLORIDE: 9 INJECTION, SOLUTION INTRAVENOUS at 18:01

## 2023-04-15 RX ADMIN — DIBASIC SODIUM PHOSPHATE, MONOBASIC POTASSIUM PHOSPHATE AND MONOBASIC SODIUM PHOSPHATE 500 MG: 852; 155; 130 TABLET ORAL at 16:57

## 2023-04-15 RX ADMIN — FLUTICASONE PROPIONATE 100 MCG: 50 SPRAY, METERED NASAL at 05:48

## 2023-04-15 RX ADMIN — OMEPRAZOLE 20 MG: 20 CAPSULE, DELAYED RELEASE ORAL at 05:48

## 2023-04-15 RX ADMIN — POTASSIUM CHLORIDE 10 MEQ: 7.46 INJECTION, SOLUTION INTRAVENOUS at 10:06

## 2023-04-15 RX ADMIN — SODIUM CHLORIDE: 9 INJECTION, SOLUTION INTRAVENOUS at 09:09

## 2023-04-15 RX ADMIN — POTASSIUM CHLORIDE 10 MEQ: 7.46 INJECTION, SOLUTION INTRAVENOUS at 09:10

## 2023-04-15 RX ADMIN — DIBASIC SODIUM PHOSPHATE, MONOBASIC POTASSIUM PHOSPHATE AND MONOBASIC SODIUM PHOSPHATE 500 MG: 852; 155; 130 TABLET ORAL at 05:47

## 2023-04-15 RX ADMIN — ASPIRIN 81 MG: 81 TABLET, COATED ORAL at 05:48

## 2023-04-15 ASSESSMENT — ENCOUNTER SYMPTOMS
BLURRED VISION: 0
ABDOMINAL PAIN: 0
MYALGIAS: 0
COUGH: 0
VOMITING: 0
ROS GI COMMENTS: DIFFICULTY SWALLOWING.
SHORTNESS OF BREATH: 1
INSOMNIA: 0
ORTHOPNEA: 0
FEVER: 0
EYE REDNESS: 0
CHILLS: 0
WEIGHT LOSS: 0
SORE THROAT: 0
CONSTIPATION: 0
HEADACHES: 0
DIARRHEA: 0
DIZZINESS: 0
SPUTUM PRODUCTION: 0
NAUSEA: 0
BACK PAIN: 0
LOSS OF CONSCIOUSNESS: 0
PALPITATIONS: 0
NERVOUS/ANXIOUS: 0
DEPRESSION: 0
WEAKNESS: 0
HEARTBURN: 0

## 2023-04-15 ASSESSMENT — FIBROSIS 4 INDEX: FIB4 SCORE: 2.57

## 2023-04-15 ASSESSMENT — LIFESTYLE VARIABLES: SUBSTANCE_ABUSE: 0

## 2023-04-15 ASSESSMENT — PATIENT HEALTH QUESTIONNAIRE - PHQ9
1. LITTLE INTEREST OR PLEASURE IN DOING THINGS: NOT AT ALL
2. FEELING DOWN, DEPRESSED, IRRITABLE, OR HOPELESS: NOT AT ALL
SUM OF ALL RESPONSES TO PHQ9 QUESTIONS 1 AND 2: 0

## 2023-04-15 ASSESSMENT — PAIN DESCRIPTION - PAIN TYPE: TYPE: ACUTE PAIN

## 2023-04-15 NOTE — PROGRESS NOTES
"Urology Progress Note    S: No fevers, chills, nausea or vomiting.     O:   /62   Pulse 73   Temp 36.6 °C (97.8 °F) (Temporal)   Resp 18   Ht 1.829 m (6' 0.01\")   Wt 61.1 kg (134 lb 11.2 oz)   SpO2 97%   Recent Labs     04/14/23  0656 04/14/23  1233 04/15/23  0003   SODIUM 143 144 145   POTASSIUM 3.5* 3.2* 3.5*   CHLORIDE 98 99 101   CO2 40* 35* 38*   GLUCOSE 102* 112* 111*   BUN 42* 44* 42*   CREATININE 0.84 0.84 0.81   CALCIUM 8.7 8.4* 8.4*             Intake/Output Summary (Last 24 hours) at 4/15/2023 1418  Last data filed at 4/15/2023 1126  Gross per 24 hour   Intake 720 ml   Output 750 ml   Net -30 ml       Exam:  Abdomen soft, benign.     Urine: clear yellow      A/P:    Active Hospital Problems    Diagnosis     Hypokalemia [E87.6]     Hypophosphatemia [E83.39]     Hydronephrosis of right kidney [N13.30]     Pleural effusion, bilateral [J90]     H/O: CVA (cerebrovascular accident) [Z86.73]     ACP (advance care planning) [Z71.89]     Age-related physical debility [R54]     Acute respiratory failure with hypoxia (HCC) [J96.01]     Underweight [R63.6]     Moderate protein-calorie malnutrition (HCC) [E44.0]     Dyslipidemia [E78.5]     Elevated troponin [R77.8]        89 y.o.M with admitted for dyspnea and pleural effusion, was found to have incidental moderate right hydroureteronephrosis on CT.  The proximal to mid right ureter is dilated with abrupt nonopacification in the mid right ureter suggesting a underlying nonspecific filling defect/mass.  Cr unchanged.  Will plan outpatient follow up in 4 weeks with BMP and RUB prior.  Urology will sign off  "

## 2023-04-15 NOTE — DISCHARGE SUMMARY
[facility] Discharge Summary    Attending: Contreras Stark M.d.  Senior Resident: Dr. Lima  Intern:  Dr. Vang  Contact Number: [phone]    CHIEF COMPLAINT ON ADMISSION  Progressively worsening shortness of breath for multiple months.     Reason for Admission  Acute hypoxic respiratory failure.   Bilateral pleural effusion.   Protein calorie malnutrition.     Admission Date  4/11/2023    CODE STATUS  DNAR/DNI    HPI & HOSPITAL COURSE  Dave Santana is a 89 y.o. male with past medical history of Hypertension, Dyslipidemia who was admitted 4/11/2023 with diagnosis of Acute hypoxic respiratory failure due to bilateral pleural effusion (larger in right).  He underwent ultrasound-guided thoracentesis on 4/12 and 2700 cc of  fluid was drained; fluid analysis revealed exudative effusion. Dependent of oxygen throughout admission.     #Acute respiratory failure with hypoxia   #Right-sided pleural effusion  - Progressive, worsening dyspnea prior to admission.   - CXR/CT Chest 04/11/23 revealed large right pleural effusion and mild left pleural effusion.   - 04/12/23: US guided thoracentesis of right effusion; 2700 cc drained.   - Pleural fluid analysis: Exudative with low glucose and lymphocyte predominance; likely malignant due to associated weight loss.   - O2 supplementation: 1-3 liters by nasal cannula; progressively decreased demand.     #Hydronephrosis of the right kidney  - Incidental finding on imaging.   - No evidence of decline in kidney function throughout admission.   - CT abdomen 04/13: confirmed right hydronephrosis with proxima ureter dilation and likely distal obstruction.   - Urology assessed 04/14: no surgical management indicated; outpatient follow up if needed.     #Protein calorie malnutrition  #Electrolyte imbalances   - Nutrition was consulted.    - Stay was complicated by electrolyte imbalances likely secondary to refeeding syndrome.    - Hypophosphatemia, resolved.   -  Hypokalemia, resolved.     #Abnormal EKG  #Elevated troponin  - On 4/14 patient was noted to have tachycardia with elevated heart rate at 96 and ventricular bigeminy seen on EKG.  -Troponins were elevated 69 however remained stable with repeat troponin is a 59.      Therefore, he is discharged in fair and stable condition to long term care facility ([facility]    The patient met 2-midnight criteria for an inpatient stay at the time of discharge.    Discharge Date  04/16/23    Physical Exam on Day of Discharge  Physical Exam  Constitutional:       General: He is not in acute distress.     Appearance: Normal appearance. He is not ill-appearing.   HENT:      Head: Normocephalic.      Nose: Nose normal. No congestion.   Eyes:      Pupils: Pupils are equal, round, and reactive to light.   Cardiovascular:      Rate and Rhythm: Normal rate and regular rhythm.      Heart sounds: No murmur heard.    No gallop.   Pulmonary:      Effort: Pulmonary effort is normal. No respiratory distress.      Breath sounds: Normal breath sounds. No wheezing.      Comments: SpO2 93-96% on 1 liter of O2 by nasal cannula.  Abdominal:      General: Abdomen is flat. There is no distension.      Palpations: Abdomen is soft. There is no mass.      Tenderness: There is no abdominal tenderness.      Hernia: No hernia is present.   Musculoskeletal:         General: No swelling or tenderness.      Cervical back: No rigidity.      Right lower leg: No edema.      Left lower leg: No edema.      Comments: Decreased muscle mass.    Skin:     General: Skin is warm.      Capillary Refill: Capillary refill takes less than 2 seconds.      Coloration: Skin is not jaundiced or pale.      Comments: Decreased subcutaneous fat tissue.    Neurological:      General: No focal deficit present.      Mental Status: He is alert and oriented to person, place, and time.      Sensory: No sensory deficit.       FOLLOW UP ITEMS POST DISCHARGE  Follow-up  with primary care physician.     DISCHARGE DIAGNOSES  Principal Problem:    Acute respiratory failure with hypoxia (HCC) POA: Unknown  Active Problems:    Pleural effusion, bilateral POA: Unknown    Elevated troponin POA: Unknown    Hypophosphatemia POA: Unknown    Hydronephrosis of right kidney POA: Unknown    Dyslipidemia POA: Yes    H/O: CVA (cerebrovascular accident) POA: Unknown    ACP (advance care planning) POA: Unknown    Age-related physical debility POA: Unknown    Underweight POA: Unknown    Moderate protein-calorie malnutrition (HCC) POA: Unknown    Hypokalemia POA: Unknown  Resolved Problems:    Essential hypertension POA: Yes      FOLLOW UP  No future appointments.  No follow-up provider specified.    MEDICATIONS ON DISCHARGE     Medication List        CONTINUE taking these medications        Instructions   acetaminophen 160 MG/5ML Susp  Commonly known as: Tylenol   Take 640 mg by mouth every 6 hours as needed. 20 ml (640 mg)  Indications: Pain  Dose: 640 mg     diclofenac sodium 1 % Gel  Commonly known as: Voltaren   Apply 2-4 g topically 4 times a day as needed. ANKLES, KNEES, LEFT SCAPULA  Dose: 2-4 g     fluticasone 50 MCG/ACT nasal spray  Commonly known as: FLONASE   Administer 2 Sprays into affected nostril(S) every day.  Dose: 2 Spray     polyethylene glycol/lytes 17 g Pack  Commonly known as: MIRALAX   Take 17 g by mouth every day.  Dose: 17 g            STOP taking these medications      amLODIPine 2.5 MG Tabs  Commonly known as: NORVASC     cefdinir 300 MG Caps  Commonly known as: OMNICEF              Allergies  Allergies   Allergen Reactions    Lidocaine     Procaine        DIET  Orders Placed This Encounter   Procedures    Diet Order Diet: Level 5 - Minced and Moist; Liquid level: Level 0 - Thin     Standing Status:   Standing     Number of Occurrences:   1     Order Specific Question:   Diet:     Answer:   Level 5 - Minced and Moist [24]     Order Specific Question:   Liquid level      Answer:   Level 0 - Thin       ACTIVITY  As tolerated and directed by skilled nursing.  Weight bearing as tolerated    CONSULTATIONS  Urology    PROCEDURES  04/12/23 US guided thoracentesis.     LABORATORY  Lab Results   Component Value Date    SODIUM 145 04/15/2023    POTASSIUM 3.5 (L) 04/15/2023    CHLORIDE 101 04/15/2023    CO2 38 (H) 04/15/2023    GLUCOSE 111 (H) 04/15/2023    BUN 42 (H) 04/15/2023    CREATININE 0.81 04/15/2023        Lab Results   Component Value Date    WBC 5.4 04/12/2023    HEMOGLOBIN 12.3 (L) 04/12/2023    HEMATOCRIT 40.3 (L) 04/12/2023    PLATELETCT 234 04/12/2023        Total time of the discharge process exceeds 30 minutes.

## 2023-04-15 NOTE — CARE PLAN
The patient is Watcher - Medium risk of patient condition declining or worsening    Shift Goals  Clinical Goals: Monitor O2 saturations, encourage PO intake, replace K  Patient Goals: Rest  Family Goals: USHA      Patient is not progressing towards the following goals:      Problem: Knowledge Deficit - Standard  Goal: Patient and family/care givers will demonstrate understanding of plan of care, disease process/condition, diagnostic tests and medications  Outcome: Not Met  Note:   Patient unable demonstrate understanding of plan of care, disease process/condition, diagnostic tests and   medications     Problem: Knowledge Deficit - COPD  Goal: Patient/significant other demonstrates understanding of disease process, utilization of the Action Plan, medications and discharge instruction  Outcome: Not Met  Note: Patient did not demonstrate understanding of disease process or action plan

## 2023-04-15 NOTE — PROGRESS NOTES
"[facility] Internal Medicine Daily Progress Note    Date of Service  4/15/2023    R Team: R IM Orange Team   Attending: Contreras Stark M.d.  Senior Resident: Dr. Lima  Intern:  Dr. Vang  Contact Number: [phone]    Hospital Course  Dave Santana is a 89 y.o. male with past medical history of Hypertension, Dyslipidemia who was admitted 4/11/2023 with diagnosis of Acute hypoxic respiratory failure due to bilateral pleural effusion (larger in right). Patient reported shortness of breath has been progressively worsening \"for a long time\" and even more in the past week prior to admission. 04/12: US guided thoracentesis: 2700 cc drained, post procedure CXR no pneumothorax; fluid sent for analysis: exudative.      Interval Problem Update  No acute events overnight per nursing staff and/or medical team. Patient comfortably resting, referring feeling increased mucus secretion from upper respiratory tract and difficulty excreting it; shortness of breath improved. On 1 liter of O2 by nasal cannula with SpO2 of 97-98%. Labs: K 3.5 (repletion indicated). Ordered Creatinine in pleural fluid to rule out Urinothorax; based on literature review; PF/Serum creatinine < 1 so diagnosis not likely. Plan for discharge 04/16/23 back to [facility] [place].     *Attempted calling nidat but unfortunately no answer.     I have discussed this patient's plan of care and discharge plan at IDT rounds today with Case Management, Nursing, Nursing leadership, and other members of the IDT team.    Code Status  DNAR/DNI    Disposition  Patient is not medically cleared for discharge.   Anticipate discharge to to home with close outpatient follow-up.  I have placed the appropriate orders for post-discharge needs.    Review of Systems  Review of Systems   Constitutional:  Negative for chills, fever and weight loss.   HENT:  Negative for congestion and sore throat.    Eyes:  Negative for blurred vision and redness.   Respiratory:  " Positive for shortness of breath. Negative for cough and sputum production.    Cardiovascular:  Negative for chest pain, palpitations, orthopnea and leg swelling.   Gastrointestinal:  Negative for abdominal pain, constipation, diarrhea, heartburn, nausea and vomiting.        Difficulty swallowing.    Genitourinary:  Negative for dysuria.   Musculoskeletal:  Negative for back pain, joint pain and myalgias.   Skin:  Negative for rash.   Neurological:  Negative for dizziness, loss of consciousness, weakness and headaches.   Endo/Heme/Allergies:  Negative for environmental allergies.   Psychiatric/Behavioral:  Negative for depression and substance abuse. The patient is not nervous/anxious and does not have insomnia.       Physical Exam  Temp:  [36.6 °C (97.8 °F)-36.9 °C (98.5 °F)] 36.6 °C (97.8 °F)  Pulse:  [63-73] 73  Resp:  [16-18] 18  BP: (124-149)/(50-74) 125/62  SpO2:  [92 %-100 %] 97 %    Physical Exam  Constitutional:       General: He is not in acute distress.     Appearance: Normal appearance. He is not ill-appearing.   HENT:      Head: Normocephalic.      Ears:      Comments: Decreased hearing.      Nose: Nose normal. No congestion.      Mouth/Throat:      Mouth: Mucous membranes are moist.      Pharynx: Oropharynx is clear.   Eyes:      General: No scleral icterus.     Pupils: Pupils are equal, round, and reactive to light.   Cardiovascular:      Rate and Rhythm: Normal rate and regular rhythm.      Pulses: Normal pulses.      Heart sounds: No murmur heard.    No gallop.   Pulmonary:      Effort: Pulmonary effort is normal. No respiratory distress.      Breath sounds: Normal breath sounds.   Abdominal:      General: Abdomen is flat. There is no distension.      Palpations: Abdomen is soft. There is no mass.      Tenderness: There is no abdominal tenderness.      Hernia: No hernia is present.   Musculoskeletal:         General: No swelling or tenderness.      Cervical back: No rigidity or tenderness.       Right lower leg: No edema.      Left lower leg: No edema.   Lymphadenopathy:      Cervical: No cervical adenopathy.   Skin:     General: Skin is warm.      Capillary Refill: Capillary refill takes less than 2 seconds.      Coloration: Skin is not jaundiced or pale.      Findings: No bruising.      Comments: Dry skin in upper and lower extremities.   Decreased subcutaneous fat tissue: temporal wasting   Neurological:      General: No focal deficit present.      Mental Status: He is alert and oriented to person, place, and time.      Cranial Nerves: No cranial nerve deficit.      Sensory: No sensory deficit.      Motor: Weakness present.     Laboratory        Recent Labs     04/14/23  0656 04/14/23  1233 04/15/23  0003   SODIUM 143 144 145   POTASSIUM 3.5* 3.2* 3.5*   CHLORIDE 98 99 101   CO2 40* 35* 38*   GLUCOSE 102* 112* 111*   BUN 42* 44* 42*   CREATININE 0.84 0.84 0.81   CALCIUM 8.7 8.4* 8.4*       Assessment/Plan  Problem Representation:    89 y.o. male with past medical history of Hypertension, Dyslipidemia, and CVA. Admitted 04/11/23 with diagnosis of Acute hypoxic respiratory failure due to bilateral pleural effusions (larger in the right). 04/12 US guided thoracentesis: 2700 cc drained; exudative effusion. Pending cytology. Repletion of K and Phosphorus indicated due to low levels.     * Acute respiratory failure with hypoxia (HCC)  Assessment & Plan  IMPROVED    Presented with respiratory distress and hypoxia.   Etiology: Right pleural effusion.   Imaging: CT chest - large right pleural effusion, moderate left pleural effusion, right lung collapse.  O2 supplementation: 2 liters by nasal cannula.  SpO2 97%.   04/12/23: US guided pleural effusion.     Pleural effusion, bilateral  Assessment & Plan  CT Chest 04/11/23: Large right pleural effusion, moderate left pleural effusion, right lung collapse  O2 supplementation: 3 liters by nasal cannula.   US guided thoracentesis (therapeutic + diagnostic): 2700 cc  drained.   Pleural analysis suggests exudative effusion.   Cytology negative for malignant cells (Sensitivity for malignant pleural effusion +/- 58%).   Unclear etiology: Malignant highly likely due to weight loss, elevated lymphocytes in pleural fluid VS Infectious, decreased glucose and elevated lymphocytes in pleural effusion.   04/14: CXR - improved right sided pleural effusion; no repeat US thoracentesis in the meantime.      Hydronephrosis of right kidney  Assessment & Plan  04/13/23: CT Abdomen/pelvis - Right hydronephrosis with proximal to mid ureter dilation likely due to filling defect/mass, no left hydronephrosis.   Bladder scan performed: 380.   Patient has condom catheter.   Urology consulted (04/14/23): No urgent surgical management; trend renal function.     Hypophosphatemia  Assessment & Plan  PO4 04/12/23: 2.3; repletion indicated.   PO4 04/13/23: 2.4; repletion indicated.   PO4 04/14/23: 1.7; indicated 500 mg Phosphorus PO. PM control: 1.9; pending to complete repletion.   PO4 04/15/23: 2.7    Monitoring for possible refeeding syndrome.     Elevated troponin  Assessment & Plan  04/14/23: High-sensitivity Troponin level of 69.   04/14/23: repeated high-sensitivity Troponin 59  Possibly due to demand ischemia.   ECG suggested anterolateral ischemia d/t T wave inversions.   Patient not referring chest pain, worsening SOB or other specific symptoms.     Hypokalemia  Assessment & Plan  04/14/23: K 3.5; indicated IV repletion of 20 mEq. Repeat PM level: 3.2; indicated 20 mEq IV added to prior 20 mEq received.   04/15/23: K 3.5    Moderate protein-calorie malnutrition (HCC)  Assessment & Plan  Underweight.   Loss of muscle mass.  Loss of subcutaneous fat.   Placed Nutrition consult.     Dyslipidemia- (present on admission)  Assessment & Plan  Lipid panel 2018: Chol 146, Triglycerides 104, HDL 34, LDL 91  ASCVD-10 score:   Statin: Atorvastatin 40 mg (high intensity)  Diet and lifestyle changes discussed  with patient.            VTE prophylaxis: SCDs/TEDs    I have performed a physical exam and reviewed and updated ROS and Plan today (4/15/2023). In review of yesterday's note (4/14/2023), there are no changes except as documented above.

## 2023-04-15 NOTE — PROGRESS NOTES
Telemetry report:      Rhythm: NSR  Heart Rate: 66 - 75  Ectopy: PVC    NJ: 0.19  QRS: 0.10  QT: 0.45    Per tele strip from monitor room:

## 2023-04-15 NOTE — CARE PLAN
The patient is Watcher - Medium risk of patient condition declining or worsening    Shift Goals  Clinical Goals: Monitor O2/respiratory status  Patient Goals: Rest  Family Goals: USHA    Progress made toward(s) clinical / shift goals:    Problem: Respiratory  Goal: Patient will achieve/maintain optimum respiratory ventilation and gas exchange  Outcome: Progressing  Note: Respiratory status stable. Pt O2 weaned from 3LN to 1.5L NC, O2 96%. Continuous pulse ox in place to monitor respiratory status.     Problem: Fall Risk  Goal: Patient will remain free from falls  Outcome: Progressing  Note: Pt remains free from falls. Bed alarm is set. Utilizes call bell appropriately to call nursing staff for assistance when needed.

## 2023-04-16 VITALS
HEART RATE: 67 BPM | WEIGHT: 134.7 LBS | HEIGHT: 72 IN | BODY MASS INDEX: 18.24 KG/M2 | TEMPERATURE: 98.5 F | SYSTOLIC BLOOD PRESSURE: 131 MMHG | OXYGEN SATURATION: 90 % | DIASTOLIC BLOOD PRESSURE: 53 MMHG | RESPIRATION RATE: 17 BRPM

## 2023-04-16 PROCEDURE — 99238 HOSP IP/OBS DSCHRG MGMT 30/<: CPT | Mod: GC | Performed by: HOSPITALIST

## 2023-04-16 PROCEDURE — A9270 NON-COVERED ITEM OR SERVICE: HCPCS

## 2023-04-16 PROCEDURE — A9270 NON-COVERED ITEM OR SERVICE: HCPCS | Performed by: STUDENT IN AN ORGANIZED HEALTH CARE EDUCATION/TRAINING PROGRAM

## 2023-04-16 PROCEDURE — 700102 HCHG RX REV CODE 250 W/ 637 OVERRIDE(OP): Performed by: STUDENT IN AN ORGANIZED HEALTH CARE EDUCATION/TRAINING PROGRAM

## 2023-04-16 PROCEDURE — 700102 HCHG RX REV CODE 250 W/ 637 OVERRIDE(OP)

## 2023-04-16 RX ADMIN — DIBASIC SODIUM PHOSPHATE, MONOBASIC POTASSIUM PHOSPHATE AND MONOBASIC SODIUM PHOSPHATE 500 MG: 852; 155; 130 TABLET ORAL at 11:35

## 2023-04-16 RX ADMIN — FLUTICASONE PROPIONATE 100 MCG: 50 SPRAY, METERED NASAL at 04:42

## 2023-04-16 RX ADMIN — DIBASIC SODIUM PHOSPHATE, MONOBASIC POTASSIUM PHOSPHATE AND MONOBASIC SODIUM PHOSPHATE 500 MG: 852; 155; 130 TABLET ORAL at 00:11

## 2023-04-16 ASSESSMENT — PATIENT HEALTH QUESTIONNAIRE - PHQ9
SUM OF ALL RESPONSES TO PHQ9 QUESTIONS 1 AND 2: 0
2. FEELING DOWN, DEPRESSED, IRRITABLE, OR HOPELESS: NOT AT ALL
1. LITTLE INTEREST OR PLEASURE IN DOING THINGS: NOT AT ALL

## 2023-04-16 ASSESSMENT — PAIN DESCRIPTION - PAIN TYPE: TYPE: ACUTE PAIN

## 2023-04-16 NOTE — DISCHARGE PLANNING
Agency/Facility Name: [id number]  Spoke To: Dante  Outcome:  Patient to transfer to [address] [place] [address] at 1500 via [facility].  RULA Pierson advised of transport time.

## 2023-04-16 NOTE — CARE PLAN
The patient is Watcher - Medium risk of patient condition declining or worsening    Shift Goals  Clinical Goals: o2 saturation  Patient Goals: sleep  Family Goals: alka    Progress made toward(s) clinical / shift goals:    Oxygen Safety in the Home Patient Education    Home oxygen is an important tool to help you be as healthy as possible.  Having oxygen in your home puts you at increased risk for fire-related injury. While oxygen itself will not catch on fire, it can make the oils in your skin more likely to catch on fire, and in fact, most oxygen-related injuries are burns on the face of the person who wears the oxygen. You can do the following things to lower your risk of having an oxygen-related fire:  Never smoke or allow anyone else to smoke in the home, or within 10 feet of you. Post a No Smoking sign in your front window for everyone’s safety.  Any source of flame or spark should be kept as far away from oxygen tanks, tubing, and concentrators as possible. This includes electronic cigarettes,  lights on appliances, space heaters, candles, incense, and gas ranges.   Do not use petroleum-based lotions or any oily products on your skin, as these products are flammable.   Make sure you have working smoke detectors in your home and check them at least every six months.  Create and practice a home evacuation plan. Make sure everyone who may have difficulty getting out of the home has a telephone within reach at all times.  Store oxygen tanks in a rack at all times. If a rack is not available, the tank should lie on its side on the floor.   Oxygen concentrators must be in an area where air can move freely around the machine. It should not be kept in a closet, and should not be covered with a blanket or other material. The machine should be dusted regularly to keep dust from clogging the vents. Concentrators have filters that need to be changed by the [employer] company on average every six months. Never plug a  concentrator into an extension cord, and keep it at least ten feet away from heat sources, neal, or open flames.   Oxygen tubing poses a tripping hazard. It also will become cracked and leak oxygen after several months of use. You should change your oxygen tubing every month. Never put blankets or rugs over the tubing.  Limit the use of space heaters. Most home fires in which people are killed are caused by space heaters. If you do use a space heater, keep it at least ten feet away from anything flammable, and all oxygen tubing or other equipment.   Remove as much clutter as possible. Papers, blankets, and boxes are fuel in a fire.     Patient is not progressing towards the following goals:

## 2023-04-16 NOTE — CARE PLAN
The patient is Stable - Low risk of patient condition declining or worsening    Shift Goals  Clinical Goals: wean patient's O2 by end of shift  Patient Goals: rest  Family Goals: not present, USHA    Progress made toward(s) clinical / shift goals:  met    Patient is not progressing towards the following goals:      Problem: Ineffective Airway Clearance  Goal: Patient will maintain patent airway with clear/clearing breath sounds  Outcome: Met     Problem: Fall Risk  Goal: Patient will remain free from falls  Outcome: Met

## 2023-04-16 NOTE — DISCHARGE PLANNING
Case Management Discharge Planning    Admission Date: 4/11/2023  GMLOS: 3.5  ALOS: 5    6-Clicks ADL Score: 6  6-Clicks Mobility Score: 8    Anticipated Discharge Dispo: Discharge Disposition: D/T to SNF with Medicare cert in anticipation of skilled care (03)    DME Needed: No    Action(s) Taken: OTHER, CM RN received notification pt is medically cleared to go back to [facility]. Per chart review onofre from [facility] oked pt to come back when MC. CM RN called Charge RN at [facility], bedside RN will need to give report to Charge RN at [facility] and cobra with packet will go with pt.     0909: Transport setup pending once ok to send back by MD.    1000: CM RN spoke to Resident, oked to set up transport for 1300. CM RN sent request for 1300 pickup. CM RN met with pt and delivered IMM and discussed transfer. Pt jcarlos with going back at 1300 with [facility]. CM RN called pt emergency contact Nicky at [phone] and notified.     Escalations Completed: Provider    Medically Clear: Yes    Next Steps: CM RN to complete packet, IMM, transport and follow up for DC summary    Barriers to Discharge: Transportation    I

## 2023-04-17 LAB
1 3 BETA D GLUCAN INTERP Q4483: NEGATIVE
1,3 BETA GLUCAN SER-MCNC: <31 PG/ML

## 2023-05-09 LAB
FUNGUS SPEC CULT: NORMAL
FUNGUS SPEC FUNGUS STN: NORMAL
SIGNIFICANT IND 70042: NORMAL
SITE SITE: NORMAL
SOURCE SOURCE: NORMAL

## 2023-05-18 ENCOUNTER — HOSPITAL ENCOUNTER (EMERGENCY)
Facility: MEDICAL CENTER | Age: 88
End: 2023-05-18
Attending: EMERGENCY MEDICINE
Payer: MEDICARE

## 2023-05-18 VITALS
DIASTOLIC BLOOD PRESSURE: 57 MMHG | OXYGEN SATURATION: 97 % | HEART RATE: 98 BPM | RESPIRATION RATE: 18 BRPM | WEIGHT: 100 LBS | TEMPERATURE: 98.7 F | BODY MASS INDEX: 13.56 KG/M2 | SYSTOLIC BLOOD PRESSURE: 123 MMHG

## 2023-05-18 DIAGNOSIS — A15.6 PLEURAL TUBERCULOSIS: ICD-10-CM

## 2023-05-18 DIAGNOSIS — F03.B0 MODERATE DEMENTIA WITHOUT BEHAVIORAL DISTURBANCE, PSYCHOTIC DISTURBANCE, MOOD DISTURBANCE, OR ANXIETY, UNSPECIFIED DEMENTIA TYPE (HCC): ICD-10-CM

## 2023-05-18 PROCEDURE — 99284 EMERGENCY DEPT VISIT MOD MDM: CPT

## 2023-05-18 PROCEDURE — 87556 M.TUBERCULO DNA AMP PROBE: CPT

## 2023-05-18 PROCEDURE — 87015 SPECIMEN INFECT AGNT CONCNTJ: CPT

## 2023-05-18 PROCEDURE — 87116 MYCOBACTERIA CULTURE: CPT

## 2023-05-18 PROCEDURE — 31720 CLEARANCE OF AIRWAYS: CPT

## 2023-05-18 PROCEDURE — 87206 SMEAR FLUORESCENT/ACID STAI: CPT

## 2023-05-18 RX ORDER — ECHINACEA PURPUREA EXTRACT 125 MG
1 TABLET ORAL
COMMUNITY

## 2023-05-18 RX ORDER — MORPHINE SULFATE 100 MG/5ML
5 SOLUTION ORAL 3 TIMES DAILY PRN
COMMUNITY

## 2023-05-18 RX ORDER — LORAZEPAM 2 MG/ML
0.5 CONCENTRATE ORAL
COMMUNITY

## 2023-05-18 RX ORDER — AMLODIPINE BESYLATE 2.5 MG/1
5 TABLET ORAL 2 TIMES DAILY
COMMUNITY

## 2023-05-18 RX ORDER — BISACODYL 10 MG
10 SUPPOSITORY, RECTAL RECTAL
COMMUNITY

## 2023-05-18 RX ORDER — ENEMA 19; 7 G/133ML; G/133ML
1 ENEMA RECTAL
COMMUNITY

## 2023-05-18 RX ORDER — AMOXICILLIN 250 MG
1 CAPSULE ORAL EVERY 12 HOURS PRN
COMMUNITY

## 2023-05-18 ASSESSMENT — PAIN SCALES - WONG BAKER: WONGBAKER_NUMERICALRESPONSE: DOESN'T HURT AT ALL

## 2023-05-18 ASSESSMENT — FIBROSIS 4 INDEX: FIB4 SCORE: 2.57

## 2023-05-18 NOTE — ED PROVIDER NOTES
ED Provider Note    Scribed for Dashawn Vieira M.D. by Brianna Fernandez. 5/18/2023  4:47 PM    Primary care provider: Pcp Pt States None  Means of arrival: Ambulance, EMS    CHIEF COMPLAINT  Chief Complaint   Patient presents with    Other       LIMITATION TO HISTORY   The patient is non verbal and was unable to provide a history of present illness.     MALINDA Santana is a 89 y.o. male who presents to the Emergency Department via EMS for a positive TB Test. Per EMS the patient had no complaints, but has a chronic cough. The patient was seen in April for pleural effusion. The patient has a history of dementia. No further history was obtained. There are no known alleviating or exacerbating factors.      OUTSIDE HISTORIAN(S):  EMS provided entire history of present illness.     EXTERNAL RECORDS REVIEWED  Review of records show a discharge summary from [facility] on 4/11/23 for bilateral pleural effusion. The patient underwent thoracentesis on 4/12 where 2700 cc of fluid was drained.       PAST MEDICAL HISTORY  Past Medical History:   Diagnosis Date    Dyslipidemia     Hypertension  Dementia        FAMILY HISTORY  Family History   Problem Relation Age of Onset    Heart Attack Mother     Stroke Father        SOCIAL HISTORY  Social History     Tobacco Use    Smoking status: Never    Smokeless tobacco: Current     Types: Chew   Substance Use Topics    Alcohol use: No    Drug use: No     Social History     Substance and Sexual Activity   Drug Use No       SURGICAL HISTORY  Past Surgical History:   Procedure Laterality Date    OTHER CARDIAC SURGERY      OTHER NEUROLOGICAL SURG         CURRENT MEDICATIONS  No current facility-administered medications for this encounter.    Current Outpatient Medications:     diclofenac sodium (VOLTAREN) 1 % Gel, Apply 2-4 g topically 4 times a day as needed. ANKLES, KNEES, LEFT SCAPULA, Disp: , Rfl:     fluticasone (FLONASE) 50 MCG/ACT nasal spray, Administer 2 Sprays into affected nostril(S)  every day., Disp: , Rfl:     polyethylene glycol/lytes (MIRALAX) 17 g Pack, Take 17 g by mouth every day., Disp: , Rfl:     acetaminophen (TYLENOL) 160 MG/5ML Suspension, Take 640 mg by mouth every 6 hours as needed. 20 ml (640 mg)  Indications: Pain, Disp: , Rfl:     ALLERGIES  Allergies   Allergen Reactions    Lidocaine     Procaine        PHYSICAL EXAM  VITAL SIGNS: BP (!) 150/69   Pulse 60   Temp 36.9 °C (98.5 °F) (Temporal)   Resp 14   Wt 45.4 kg (100 lb)   SpO2 100%   BMI 13.56 kg/m²   Reviewed and hypertensive, afebrile afebrile, hypertensive  Constitutional: Well developed, , Nonverbal, Cachectic, Sleepy, Aroused to physical stimulation.  HENT: Normocephalic, atraumatic, bilateral external ears normal  Eyes: 2mm reactive pupils, conjunctiva pink, no scleral icterus.   Cardiovascular: Regular rate and rhythm. No murmurs, rubs or gallops.  No dependent edema or calf tenderness  Respiratory: Diminished breath sounds on the right.. No wheezes, rales, or rhonchi.  Abdominal:  Abdomen soft, non-tender, non distended. No rebound, or guarding.    Skin: 3 cm superficial epidermal ulceration to left shin.  Stage II decubitus to left heel..  Genitourinary: No costovertebral angle tenderness.   Musculoskeletal: no deformities.   Neurologic: Alert & oriented x 3, cranial nerves 2-12 intact by passive exam.  No focal deficit noted.  Psychiatric: Affect normal, Judgment normal, Mood normal.       ED COURSE:  4:47 PM - Patient seen and examined at bedside.     INTERVENTIONS BY ME:    I have discussed management of the patient with the following physicians and sources:      4:58 PM - Discussed management with Dr. Hernandez with infectious diseases.     Sputum for NAAT for TB ordered and collected by respiratory as requested by Dr. Hernandez.  This is to determine whether the patient was infectious while he was at his recent care home.    5:32 PM - Discussed management with Dr. Odom  for patient's transfer. Discussed  management with Lissette from [facility] transfer and operations [place] ([facility]).     MEDICAL DECISION MAKING:  PROBLEMS EVALUATED THIS VISIT:    This patient presents with known pleural tuberculosis with pleural effusions.  He has dementia.  Plan is hospice care.  He is accidentally brought to the wrong facility.  [facility]    RISK:  High given escalation of care to hospice         PLAN:    The patient will be transferred to the [facility] hospice unit.    CONDITION: Guarded.     FINAL IMPRESSION  1. Pleural tuberculosis    2. Moderate dementia without behavioral disturbance, psychotic disturbance, mood disturbance, or anxiety, unspecified dementia type (HCC)        Brianna RIZZO (Scribe), am scribing for, and in the presence of, Dashawn Vieira M.D..    Electronically signed by: Brianna Fernandez (Connoribcain), 5/18/2023    Dashawn RIZZO M.D. personally performed the services described in this documentation, as scribed by Brianna Fernandez in my presence, and it is both accurate and complete.    The note accurately reflects work and decisions made by me.  Dashawn Vieira M.D.  5/18/2023  8:10 PM

## 2023-05-18 NOTE — ED TRIAGE NOTES
[facility] pt had a positive TB test so was sent to ER per EMS pt has no complaints and has had a cough but this is chronic. Pt was seen back in April and had pleural effusions. Pt has hx of dementia and per ems pt is at baseline A&Ox1 however pt does not respond or answer any questions for me.

## 2023-05-19 LAB
M TB CMPLX DNA ISLT/SPM QL: NOT DETECTED
RHODAMINE-AURAMINE STN SPEC: NORMAL
SIGNIFICANT IND 70042: NORMAL
SITE SITE: NORMAL
SOURCE SOURCE: NORMAL

## 2023-05-19 NOTE — ED NOTES
Bedside report from EMELI Hdz    Pt on 6LPM on wall O2. Pt resting comfortably with family at bedside. Airborne precautions in place.

## 2023-05-19 NOTE — ED NOTES
Transfer packet provided to [facility] staff    Daughter provided update on POC.    Vitals reassessed and WNL

## 2023-05-19 NOTE — ED NOTES
[facility] Report given.    Pt transfer packet was not completed despite due to miscommunication between day and night shift management.  working on completion now.

## 2023-05-19 NOTE — ED NOTES
Patient repositioned. Anticipate patient transfer to the [facility] hospice unit. Updated daughter at the bedside.

## 2023-06-06 LAB
MYCOBACTERIUM SPEC CULT: ABNORMAL
MYCOBACTERIUM SPEC CULT: ABNORMAL
RHODAMINE-AURAMINE STN SPEC: ABNORMAL
SIGNIFICANT IND 70042: ABNORMAL
SITE SITE: ABNORMAL
SOURCE SOURCE: ABNORMAL

## 2023-06-20 LAB — UNCODED TEST RESULT 95040: NORMAL

## 2023-06-28 NOTE — ED NOTES
ED Positive Culture Follow-up/Notification Note:    Date: 6/27/23     Patient seen in the ED on 5/18/2023 for positive TB test. Recently treated for pleural effusion with thoracentesis 4/2023. Discussed with Dr. Hernandez, ID, during ED visit where it was requested to obtain NAAT for TB.   1. Pleural tuberculosis    2. Moderate dementia without behavioral disturbance, psychotic disturbance, mood disturbance, or anxiety, unspecified dementia type (HCC)       Discharge Medication List as of 5/18/2023 11:55 PM          Allergies: Lidocaine and Procaine     Vitals:    05/18/23 2134 05/18/23 2202 05/18/23 2302 05/18/23 2345   BP: 138/63 132/64 135/62 123/57   Pulse: 62 71 65 98   Resp: 16 (!) 23 18 18   Temp:    37.1 °C (98.7 °F)   TempSrc:    Temporal   SpO2: 100% 99% 100% 97%   Weight:           Final cultures:   Results       Procedure Component Value Units Date/Time    AFB Culture [[id number]]  (Abnormal) Collected: 05/18/23 1720    Order Status: Completed Specimen: Respirate Updated: 06/27/23 1102     Significant Indicator POS     Source RESP     Site Sputum (coughed)     Culture Result Acid fast bacilli detected by MGIT 960 instrument.  Sent to [facility] for further  identification.       AFB Smear Results No acid fast bacilli seen.     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 tb complex  Negative for M. avium complex  Testing performed at:  [facility]  [facility] [place] [id number] [phone]      Narrative:      CALL  [facility]  ER tel. , Faxed to the provider  CALLED  ER tel.  06/20/2023, 18:33, RB PERF. RESULTS CALLED TO: ER x [phone]            Plan:   [facility]. Informed infection prevention of result.    Carmen Garcia, PharmD